# Patient Record
Sex: FEMALE | Race: WHITE | NOT HISPANIC OR LATINO | Employment: FULL TIME | URBAN - METROPOLITAN AREA
[De-identification: names, ages, dates, MRNs, and addresses within clinical notes are randomized per-mention and may not be internally consistent; named-entity substitution may affect disease eponyms.]

---

## 2023-09-25 LAB
ABO GROUP (TYPE) IN BLOOD: NORMAL
ANTIBODY SCREEN: NORMAL
ERYTHROCYTE DISTRIBUTION WIDTH (RATIO) BY AUTOMATED COUNT: 13.7 % (ref 11.5–14.5)
ERYTHROCYTE MEAN CORPUSCULAR HEMOGLOBIN CONCENTRATION (G/DL) BY AUTOMATED: 32.7 G/DL (ref 32–36)
ERYTHROCYTE MEAN CORPUSCULAR VOLUME (FL) BY AUTOMATED COUNT: 91 FL (ref 80–100)
ERYTHROCYTES (10*6/UL) IN BLOOD BY AUTOMATED COUNT: 4.38 X10E12/L (ref 4–5.2)
ESTIMATED AVERAGE GLUCOSE FOR HBA1C: 111 MG/DL
HEMATOCRIT (%) IN BLOOD BY AUTOMATED COUNT: 39.8 % (ref 36–46)
HEMOGLOBIN (G/DL) IN BLOOD: 13 G/DL (ref 12–16)
HEMOGLOBIN A1C/HEMOGLOBIN TOTAL IN BLOOD: 5.5 %
HEPATITIS B VIRUS SURFACE AG PRESENCE IN SERUM: NONREACTIVE
HIV 1/ 2 AG/AB SCREEN: NONREACTIVE
LEUKOCYTES (10*3/UL) IN BLOOD BY AUTOMATED COUNT: 6.1 X10E9/L (ref 4.4–11.3)
PLATELETS (10*3/UL) IN BLOOD AUTOMATED COUNT: 217 X10E9/L (ref 150–450)
REFLEX ADDED, ANEMIA PANEL: NORMAL
RH FACTOR: NORMAL
RUBELLA VIRUS IGG AB: NEGATIVE
SYPHILIS TOTAL AB: NONREACTIVE

## 2023-09-26 LAB — URINE CULTURE: NORMAL

## 2023-09-27 LAB
ABO GROUP (TYPE) IN BLOOD: NORMAL
ANTIBODY SCREEN: NORMAL
CHLAMYDIA TRACH., AMPLIFIED: NEGATIVE
N. GONORRHEA, AMPLIFIED: NEGATIVE
RH FACTOR: NORMAL

## 2023-10-06 ENCOUNTER — ANCILLARY PROCEDURE (OUTPATIENT)
Dept: RADIOLOGY | Facility: CLINIC | Age: 33
End: 2023-10-06
Payer: COMMERCIAL

## 2023-10-06 DIAGNOSIS — Z32.01 PREGNANCY TEST POSITIVE (HHS-HCC): ICD-10-CM

## 2023-10-06 PROCEDURE — 76817 TRANSVAGINAL US OBSTETRIC: CPT | Performed by: OBSTETRICS & GYNECOLOGY

## 2023-10-06 PROCEDURE — 76801 OB US < 14 WKS SINGLE FETUS: CPT

## 2023-10-06 PROCEDURE — 76817 TRANSVAGINAL US OBSTETRIC: CPT

## 2023-10-06 PROCEDURE — 76801 OB US < 14 WKS SINGLE FETUS: CPT | Performed by: OBSTETRICS & GYNECOLOGY

## 2023-10-13 PROBLEM — K21.9 GERD (GASTROESOPHAGEAL REFLUX DISEASE): Status: ACTIVE | Noted: 2023-10-13

## 2023-10-13 PROBLEM — F41.1 GENERALIZED ANXIETY DISORDER: Status: ACTIVE | Noted: 2023-10-13

## 2023-10-13 PROBLEM — Z34.90 PREGNANCY (HHS-HCC): Status: ACTIVE | Noted: 2023-10-13

## 2023-10-13 RX ORDER — PNV NO.95/FERROUS FUM/FOLIC AC 28MG-0.8MG
TABLET ORAL
COMMUNITY
End: 2024-04-18 | Stop reason: HOSPADM

## 2023-10-23 ENCOUNTER — ROUTINE PRENATAL (OUTPATIENT)
Dept: OBSTETRICS AND GYNECOLOGY | Facility: CLINIC | Age: 33
End: 2023-10-23
Payer: COMMERCIAL

## 2023-10-23 VITALS — SYSTOLIC BLOOD PRESSURE: 118 MMHG | BODY MASS INDEX: 31.25 KG/M2 | DIASTOLIC BLOOD PRESSURE: 70 MMHG | WEIGHT: 201 LBS

## 2023-10-23 DIAGNOSIS — Z3A.10 10 WEEKS GESTATION OF PREGNANCY (HHS-HCC): Primary | ICD-10-CM

## 2023-10-23 DIAGNOSIS — Z34.81 MULTIGRAVIDA IN FIRST TRIMESTER (HHS-HCC): ICD-10-CM

## 2023-10-23 PROCEDURE — 99213 OFFICE O/P EST LOW 20 MIN: CPT | Performed by: OBSTETRICS & GYNECOLOGY

## 2023-10-23 RX ORDER — CALCIUM CARBONATE 200(500)MG
TABLET,CHEWABLE ORAL
COMMUNITY
End: 2024-04-18 | Stop reason: HOSPADM

## 2023-10-23 NOTE — PROGRESS NOTES
Subjective   Patient ID [id number]   Amaya Todd is a 33 y.o.  at Unknown with a working estimated date of delivery of Not found. who presents for a routine prenatal visit. She denies vaginal bleeding, leakage of fluid, decreased fetal movements, and contractions.    Patient is doing well with no concerns.  States her nausea is improving.  No concerns.    Objective   Physical Exam   , Pregravid BMI: 34.83  Expected Total Weight Gain: 5 kg (11 lb)-9 kg (19 lb)   BP: 118/70  Fetal Heart Rate: 160      Prenatal Labs  Urine dip:  Lab Results   Component Value Date    KETONESU NEGATIVE 2023     Lab Results   Component Value Date    HGB 13.0 2023    HCT 39.8 2023    ABO A 2023    ABO CANCELED 2023    HEPBSAG NONREACTIVE 2023         Assessment/Plan   Problem List Items Addressed This Visit             ICD-10-CM    Multigravida in first trimester Z34.81    10 weeks gestation of pregnancy - Primary Z3A.10     -- History of  x3: We will repeat, patient is anxious about fourth   --History of MACROSOMIA 10 pounds 5 ounces: HgbA1c 5.5  --History of anxiety disorder, kidney stones  --Family history of Down syndrome and muscular dystrophy: Patient declines cfDNA, cystic fibrosis, and SMA  --ULTRASOUND: Dating ultrasound on 10/6 at 7.1 weeks, scheduled for 13-week ultrasound on             Follow-up in 4 weeks

## 2023-10-23 NOTE — ASSESSMENT & PLAN NOTE
-- History of  x3: We will repeat, patient is anxious about fourth   --History of MACROSOMIA 10 pounds 5 ounces: HgbA1c 5.5  --History of anxiety disorder, kidney stones  --Family history of Down syndrome and muscular dystrophy: Patient declines cfDNA, cystic fibrosis, and SMA  --ULTRASOUND: Dating ultrasound on 10/6 at 7.1 weeks, scheduled for 13-week ultrasound on

## 2023-11-13 ENCOUNTER — ANCILLARY PROCEDURE (OUTPATIENT)
Dept: RADIOLOGY | Facility: CLINIC | Age: 33
End: 2023-11-13
Payer: COMMERCIAL

## 2023-11-13 DIAGNOSIS — Z36.82 ENCOUNTER FOR ANTENATAL SCREENING FOR NUCHAL TRANSLUCENCY (HHS-HCC): ICD-10-CM

## 2023-11-13 PROCEDURE — 76813 OB US NUCHAL MEAS 1 GEST: CPT | Performed by: OBSTETRICS & GYNECOLOGY

## 2023-11-13 PROCEDURE — 76813 OB US NUCHAL MEAS 1 GEST: CPT

## 2023-11-16 ENCOUNTER — TELEPHONE (OUTPATIENT)
Dept: OBSTETRICS AND GYNECOLOGY | Facility: CLINIC | Age: 33
End: 2023-11-16
Payer: COMMERCIAL

## 2023-11-16 DIAGNOSIS — Z34.81 MULTIGRAVIDA IN FIRST TRIMESTER (HHS-HCC): Primary | ICD-10-CM

## 2023-11-16 RX ORDER — ONDANSETRON 4 MG/1
4 TABLET, ORALLY DISINTEGRATING ORAL EVERY 6 HOURS PRN
Qty: 20 TABLET | Refills: 0 | Status: SHIPPED | OUTPATIENT
Start: 2023-11-16 | End: 2023-11-23

## 2023-11-16 RX ORDER — SERTRALINE HYDROCHLORIDE 50 MG/1
50 TABLET, FILM COATED ORAL DAILY
Qty: 30 TABLET | Refills: 11 | Status: SHIPPED | OUTPATIENT
Start: 2023-11-16 | End: 2023-12-08 | Stop reason: WASHOUT

## 2023-11-20 ENCOUNTER — ROUTINE PRENATAL (OUTPATIENT)
Dept: OBSTETRICS AND GYNECOLOGY | Facility: CLINIC | Age: 33
End: 2023-11-20
Payer: COMMERCIAL

## 2023-11-20 VITALS — DIASTOLIC BLOOD PRESSURE: 82 MMHG | BODY MASS INDEX: 31.56 KG/M2 | WEIGHT: 203 LBS | SYSTOLIC BLOOD PRESSURE: 130 MMHG

## 2023-11-20 DIAGNOSIS — Z3A.14 14 WEEKS GESTATION OF PREGNANCY (HHS-HCC): Primary | ICD-10-CM

## 2023-11-20 DIAGNOSIS — Z34.82 MULTIGRAVIDA IN SECOND TRIMESTER (HHS-HCC): ICD-10-CM

## 2023-11-20 PROCEDURE — 99213 OFFICE O/P EST LOW 20 MIN: CPT | Performed by: OBSTETRICS & GYNECOLOGY

## 2023-11-20 NOTE — ASSESSMENT & PLAN NOTE
IUP at 14.1 weeks.  Nausea is controlled with Zofran as needed.  Patient had increased anxiety last week, started Zoloft however discontinued after 1 dose.  Feels she is doing better this week, desires to observe.  Patient with anatomy scan scheduled for January 4    PLAN:  Follow-up in 4 weeks  Anatomy scan January 4

## 2023-11-20 NOTE — PROGRESS NOTES
Subjective     Amaya Todd is a 33 y.o.  at 14w1d with a working estimated date of delivery of 2024, by Last Menstrual Period who presents for a routine prenatal visit.     Patient is doing well, nausea controlled with Zofran.  Patient has had moderate anxiety, tried Zoloft however stopped after 1 dose, desires to observe at this time.    Objective   Physical Exam  Weight: 92.1 kg (203 lb)  Expected Total Weight Gain: 5 kg (11 lb)-9 kg (19 lb)   Pregravid BMI: 34.83  BP: 130/82  Fetal Heart Rate: 160 Fundal Height (cm): 14 cm    Prenatal Labs  Urine dip:  Lab Results   Component Value Date    KETONESU NEGATIVE 2023       Lab Results   Component Value Date    HGB 13.0 2023    HCT 39.8 2023    ABO A 2023    ABO CANCELED 2023    HEPBSAG NONREACTIVE 2023             Problem List Items Addressed This Visit       Multigravida in second trimester    14 weeks gestation of pregnancy - Primary    Overview     -- History of  x3: We will repeat, patient is anxious about fourth   --History of MACROSOMIA 10 pounds 5 ounces: HgbA1c 5.5  --History of anxiety patient desires to observe   -- History of  kidney stones  --Family history of Down syndrome and muscular dystrophy: Patient declines cfDNA, cystic fibrosis, and SMA  --ULTRASOUND: Dating ultrasound on 10/6 at 7.1 weeks, normal NT on , anatomy scan scheduled          Current Assessment & Plan     IUP at 14.1 weeks.  Nausea is controlled with Zofran as needed.  Patient had increased anxiety last week, started Zoloft however discontinued after 1 dose.  Feels she is doing better this week, desires to observe.  Patient with anatomy scan scheduled for     PLAN:  Follow-up in 4 weeks  Anatomy scan

## 2023-11-30 ENCOUNTER — HOSPITAL ENCOUNTER (EMERGENCY)
Facility: HOSPITAL | Age: 33
Discharge: HOME | End: 2023-11-30
Attending: EMERGENCY MEDICINE
Payer: COMMERCIAL

## 2023-11-30 VITALS
BODY MASS INDEX: 31.37 KG/M2 | DIASTOLIC BLOOD PRESSURE: 80 MMHG | HEART RATE: 88 BPM | SYSTOLIC BLOOD PRESSURE: 128 MMHG | OXYGEN SATURATION: 98 % | TEMPERATURE: 98.2 F | HEIGHT: 68 IN | RESPIRATION RATE: 16 BRPM | WEIGHT: 207 LBS

## 2023-11-30 DIAGNOSIS — O23.42 URINARY TRACT INFECTION IN MOTHER DURING SECOND TRIMESTER OF PREGNANCY (HHS-HCC): Primary | ICD-10-CM

## 2023-11-30 LAB
APPEARANCE UR: CLEAR
BACTERIA #/AREA URNS AUTO: ABNORMAL /HPF
BILIRUB UR STRIP.AUTO-MCNC: NEGATIVE MG/DL
COLOR UR: ABNORMAL
GLUCOSE UR STRIP.AUTO-MCNC: NEGATIVE MG/DL
KETONES UR STRIP.AUTO-MCNC: NEGATIVE MG/DL
LEUKOCYTE ESTERASE UR QL STRIP.AUTO: NEGATIVE
MUCOUS THREADS #/AREA URNS AUTO: ABNORMAL /LPF
NITRITE UR QL STRIP.AUTO: NEGATIVE
PH UR STRIP.AUTO: 6 [PH]
PROT UR STRIP.AUTO-MCNC: NEGATIVE MG/DL
RBC # UR STRIP.AUTO: ABNORMAL /UL
RBC #/AREA URNS AUTO: >20 /HPF
SP GR UR STRIP.AUTO: 1.01
SQUAMOUS #/AREA URNS AUTO: ABNORMAL /HPF
UROBILINOGEN UR STRIP.AUTO-MCNC: <2 MG/DL
WBC #/AREA URNS AUTO: ABNORMAL /HPF

## 2023-11-30 PROCEDURE — 2500000001 HC RX 250 WO HCPCS SELF ADMINISTERED DRUGS (ALT 637 FOR MEDICARE OP): Performed by: EMERGENCY MEDICINE

## 2023-11-30 PROCEDURE — 99283 EMERGENCY DEPT VISIT LOW MDM: CPT | Performed by: EMERGENCY MEDICINE

## 2023-11-30 PROCEDURE — 81003 URINALYSIS AUTO W/O SCOPE: CPT | Performed by: EMERGENCY MEDICINE

## 2023-11-30 RX ORDER — CEPHALEXIN 250 MG/1
500 CAPSULE ORAL ONCE
Status: COMPLETED | OUTPATIENT
Start: 2023-11-30 | End: 2023-11-30

## 2023-11-30 RX ORDER — CEPHALEXIN 500 MG/1
500 CAPSULE ORAL 2 TIMES DAILY
Qty: 20 CAPSULE | Refills: 0 | Status: SHIPPED | OUTPATIENT
Start: 2023-11-30 | End: 2023-12-10

## 2023-11-30 RX ADMIN — CEPHALEXIN 500 MG: 250 CAPSULE ORAL at 05:41

## 2023-11-30 ASSESSMENT — COLUMBIA-SUICIDE SEVERITY RATING SCALE - C-SSRS
1. IN THE PAST MONTH, HAVE YOU WISHED YOU WERE DEAD OR WISHED YOU COULD GO TO SLEEP AND NOT WAKE UP?: NO
2. HAVE YOU ACTUALLY HAD ANY THOUGHTS OF KILLING YOURSELF?: NO
6. HAVE YOU EVER DONE ANYTHING, STARTED TO DO ANYTHING, OR PREPARED TO DO ANYTHING TO END YOUR LIFE?: NO

## 2023-11-30 ASSESSMENT — PAIN - FUNCTIONAL ASSESSMENT: PAIN_FUNCTIONAL_ASSESSMENT: 0-10

## 2023-11-30 ASSESSMENT — PAIN SCALES - GENERAL: PAINLEVEL_OUTOF10: 5 - MODERATE PAIN

## 2023-11-30 NOTE — ED PROVIDER NOTES
HPI   Chief Complaint   Patient presents with    Urinary Problem     Patient presents to the ED with c/o thinking she has an UTI.  She is 15 wks pregnant.  Patient denies abdominal pain.       HPI     HISTORY OF PRESENT ILLNESS:  Patient is a 33-year-old female G5, P3 with history of 1 miscarriage, currently 15 weeks gestation presenting to the emergency department dysuria.  Patient woke up with pain.  She has had prior kidney stones and does not feel any side pain, nausea.  No rash.  No vaginal discharge.  This does not feel like her kidney stone.  Dr. Deutsch is her OB gynecologist.    Past Medical History: Prior history of kidney stones, she has bad anxiety  Past Surgical History: 3 C-sections      __________________________________________________________  PHYSICAL EXAM:    Appearance: Alert, oriented , cooperative   Skin: Intact,  dry skin, no lesions, rash, petechiae or purpura.   Eyes: PERRLA, EOMs intact,  Conjunctiva pink with no redness or exudates.    HENT: Normocephalic, atraumatic. Nares patent   Neck: Supple. Trachea at midline.   Pulmonary: Lung sounds are clear bilaterally.  There is no rales, rhonchi, or wheezing.  Cardiac: Regular rate and rhythm, no rubs, murmurs, or gallops. No JVD,   Abdomen: Abdomen is soft, nontender, and nondistended.  No palpable organomegaly.  No rebound or guarding.  No CVA tenderness. Nonsurgical abdomen  Genitourinary: Exam deferred.  Musculoskeletal: no edema, pain, cyanosis, or deformity in extremities. Pulses full and equal.   Neurological:  Cranial nerves are grossly intact, grossly normal sensation, no weakness, no focal findings identified.    __________________________________________________________  MEDICAL DECISION MAKING:    Patient was seen and examined.  Patient is having discomfort at her urethra which woke her up.  She does have a prior history of kidney stones but this does not feel like a kidney stone.  When I evaluated her, I could not elicit any  CVA tenderness or abdominal tenderness.  She is not complaining of vaginal discharge.  No rash.  She had already underwent STD testing by her OB gynecologist and I do not think a speculum exam was needed at this time.  Patient urinalysis did have 1+ bacteria in addition to red blood cells.  Again, I do not believe that there was a kidney stone with the lack of flank or belly pain.  Patient was provided antibiotics for presumed urinary tract infection.  Return precautions discussed.  Patient follow-up with her OB gynecologist.  Patient verbalized understanding, agreed to plan, patient was discharged home.  No, patient vital sign was initially tachycardic but she states she is extremely anxious and did state come down without any intervention such as IV fluids.    Tera Esteves  Emergency Medicine               Han Coma Scale Score: 15                  Patient History   Past Medical History:   Diagnosis Date    11 weeks gestation of pregnancy 2019    11 weeks gestation of pregnancy    8 weeks gestation of pregnancy 2019    8 weeks gestation of pregnancy    Abnormal glucose complicating pregnancy 2021    Pregnancy with abnormal glucose tolerance test    Encounter for  screening for malformations 10/07/2021    Encounter for  screening for malformation    Encounter for supervision of normal pregnancy, unspecified, first trimester 2021    Encounter for pregnancy related examination, first trimester    Encounter for supervision of normal pregnancy, unspecified, second trimester 2021    Encounter for pregnancy related examination in second trimester    Encounter for supervision of normal pregnancy, unspecified, third trimester 2022    Encounter for pregnancy related examination in third trimester    Less than 8 weeks gestation of pregnancy 10/14/2019    Less than 8 weeks gestation of pregnancy    Other conditions influencing health status 10/14/2019    History of  pregnancy    Pregnancy with inconclusive fetal viability, not applicable or unspecified 2021    Encounter to determine fetal viability of pregnancy     Past Surgical History:   Procedure Laterality Date    OTHER SURGICAL HISTORY  10/28/2022     section    OTHER SURGICAL HISTORY  2020    [place] tooth extraction    OTHER SURGICAL HISTORY  10/31/2019    Dilation and curettage     No family history on file.  Social History     Tobacco Use    Smoking status: Never    Smokeless tobacco: Never   Vaping Use    Vaping Use: Never used   Substance Use Topics    Alcohol use: Not Currently    Drug use: Never       Physical Exam   ED Triage Vitals [23 0054]   Temp Heart Rate Resp BP   36.5 °C (97.7 °F) (!) 119 16 146/80      SpO2 Temp src Heart Rate Source Patient Position   98 % -- -- --      BP Location FiO2 (%)     -- --       Physical Exam    ED Course & MDM   Diagnoses as of 23 0530   Urinary tract infection in mother during second trimester of pregnancy       Medical Decision Making      Procedure  Procedures     Tera Esteves DO  [id number]

## 2023-12-04 ENCOUNTER — TELEPHONE (OUTPATIENT)
Dept: OBSTETRICS AND GYNECOLOGY | Facility: CLINIC | Age: 33
End: 2023-12-04
Payer: COMMERCIAL

## 2023-12-04 NOTE — TELEPHONE ENCOUNTER
Patient called stating she went to the ER on 11/29/23. She was diagnosed with a UTI and was told to follow up with Dr. Deutsch. She said it is better than it was but feels like it is still lingering. She does have 5 more days of Keflex to take.

## 2023-12-05 ENCOUNTER — APPOINTMENT (OUTPATIENT)
Dept: RADIOLOGY | Facility: CLINIC | Age: 33
End: 2023-12-05
Payer: COMMERCIAL

## 2023-12-06 PROBLEM — Z3A.16 16 WEEKS GESTATION OF PREGNANCY (HHS-HCC): Status: ACTIVE | Noted: 2023-10-23

## 2023-12-08 ENCOUNTER — ROUTINE PRENATAL (OUTPATIENT)
Dept: OBSTETRICS AND GYNECOLOGY | Facility: CLINIC | Age: 33
End: 2023-12-08
Payer: COMMERCIAL

## 2023-12-08 VITALS — SYSTOLIC BLOOD PRESSURE: 118 MMHG | BODY MASS INDEX: 31.78 KG/M2 | WEIGHT: 209 LBS | DIASTOLIC BLOOD PRESSURE: 72 MMHG

## 2023-12-08 DIAGNOSIS — Z3A.16 16 WEEKS GESTATION OF PREGNANCY (HHS-HCC): ICD-10-CM

## 2023-12-08 DIAGNOSIS — Z34.82 MULTIGRAVIDA IN SECOND TRIMESTER (HHS-HCC): Primary | ICD-10-CM

## 2023-12-08 PROCEDURE — 99213 OFFICE O/P EST LOW 20 MIN: CPT | Performed by: OBSTETRICS & GYNECOLOGY

## 2023-12-08 RX ORDER — POLYETHYLENE GLYCOL 3350 17 G/17G
17 POWDER, FOR SOLUTION ORAL DAILY
COMMUNITY
End: 2024-04-18 | Stop reason: HOSPADM

## 2023-12-08 NOTE — PROGRESS NOTES
Subjective     Amaya Todd is a 33 y.o.  at 16w5d with a working estimated date of delivery of 2024, by Last Menstrual Period who presents for a routine prenatal visit.     Patient with fetal flutters.  Was seen in the ED, currently finishing course of Keflex.  Prior symptoms have resolved.    Objective   Physical Exam  Weight: 94.8 kg (209 lb)  Expected Total Weight Gain: 5 kg (11 lb)-9 kg (19 lb)   Pregravid BMI: 34.07  BP: 118/72         Prenatal Labs  Urine dip:  Lab Results   Component Value Date    KETONESU NEGATIVE 2023       Lab Results   Component Value Date    HGB 13.0 2023    HCT 39.8 2023    ABO A 2023    ABO CANCELED 2023    HEPBSAG NONREACTIVE 2023             Problem List Items Addressed This Visit       Multigravida in second trimester - Primary    16 weeks gestation of pregnancy    Overview     -- History of  x3: We will repeat, patient is anxious about fourth   --History of MACROSOMIA 10 pounds 5 ounces: HgbA1c 5.5  --History of anxiety patient desires to observe   -- History of  kidney stones  --Family history of Down syndrome and muscular dystrophy: Patient declines cfDNA, cystic fibrosis, and SMA  --ULTRASOUND: Dating ultrasound on 10/6 at 7.1 weeks, normal NT on , anatomy scan scheduled          Current Assessment & Plan     IUP at 16.5 weeks patient feeling occasional flutters  Patient seen in ED last week, diagnosed with UTI and placed on Keflex.  Patient was having cramping, states she is feeling better.  Denies fever or chills.  Urine culture not sent  No other concerns.    PLAN:  Finish Keflex  Follow-up with routine OB care

## 2023-12-18 PROBLEM — Z3A.18 18 WEEKS GESTATION OF PREGNANCY (HHS-HCC): Status: ACTIVE | Noted: 2023-10-23

## 2023-12-19 ENCOUNTER — ROUTINE PRENATAL (OUTPATIENT)
Dept: OBSTETRICS AND GYNECOLOGY | Facility: CLINIC | Age: 33
End: 2023-12-19
Payer: COMMERCIAL

## 2023-12-19 VITALS — WEIGHT: 211 LBS | SYSTOLIC BLOOD PRESSURE: 132 MMHG | BODY MASS INDEX: 32.08 KG/M2 | DIASTOLIC BLOOD PRESSURE: 64 MMHG

## 2023-12-19 DIAGNOSIS — Z3A.18 18 WEEKS GESTATION OF PREGNANCY (HHS-HCC): ICD-10-CM

## 2023-12-19 DIAGNOSIS — Z34.82 MULTIGRAVIDA IN SECOND TRIMESTER (HHS-HCC): Primary | ICD-10-CM

## 2023-12-19 PROCEDURE — 99213 OFFICE O/P EST LOW 20 MIN: CPT | Performed by: OBSTETRICS & GYNECOLOGY

## 2023-12-19 NOTE — ASSESSMENT & PLAN NOTE
IUP at 18.2 weeks  Patient doing well with no concerns.  Repeat ultrasound scheduled January 4    PLAN:  Follow-up in 4 weeks

## 2023-12-19 NOTE — PROGRESS NOTES
Subjective     Amaya Todd is a 33 y.o.  at 18w2d with a working estimated date of delivery of 2024, by Last Menstrual Period who presents for a routine prenatal visit.       Objective   Physical Exam  Weight: 95.7 kg (211 lb)  Expected Total Weight Gain: 5 kg (11 lb)-9 kg (19 lb)   Pregravid BMI: 34.07  BP: 132/64  Fetal Heart Rate: 130 Fundal Height (cm): 18 cm    Prenatal Labs  Urine dip:  Lab Results   Component Value Date    KETONESU NEGATIVE 2023       Lab Results   Component Value Date    HGB 13.0 2023    HCT 39.8 2023    ABO A 2023    ABO CANCELED 2023    HEPBSAG NONREACTIVE 2023             Problem List Items Addressed This Visit       Multigravida in second trimester - Primary    18 weeks gestation of pregnancy    Overview     -- History of  x3: We will repeat, patient is anxious about fourth   --History of MACROSOMIA 10 pounds 5 ounces: HgbA1c 5.5  --History of anxiety patient desires to observe   -- History of  kidney stones  --Family history of Down syndrome and muscular dystrophy: Patient declines cfDNA, cystic fibrosis, and SMA  --ULTRASOUND: Dating ultrasound on 10/6 at 7.1 weeks, normal NT on , anatomy scan scheduled          Current Assessment & Plan     IUP at 18.2 weeks  Patient doing well with no concerns.  Repeat ultrasound scheduled     PLAN:  Follow-up in 4 weeks

## 2024-01-04 ENCOUNTER — ANCILLARY PROCEDURE (OUTPATIENT)
Dept: RADIOLOGY | Facility: CLINIC | Age: 34
End: 2024-01-04
Payer: COMMERCIAL

## 2024-01-04 DIAGNOSIS — O09.899: ICD-10-CM

## 2024-01-04 DIAGNOSIS — Z3A.10 10 WEEKS GESTATION OF PREGNANCY (HHS-HCC): ICD-10-CM

## 2024-01-04 PROCEDURE — 76817 TRANSVAGINAL US OBSTETRIC: CPT

## 2024-01-04 PROCEDURE — 76817 TRANSVAGINAL US OBSTETRIC: CPT | Performed by: STUDENT IN AN ORGANIZED HEALTH CARE EDUCATION/TRAINING PROGRAM

## 2024-01-04 PROCEDURE — 76811 OB US DETAILED SNGL FETUS: CPT

## 2024-01-04 PROCEDURE — 76811 OB US DETAILED SNGL FETUS: CPT | Performed by: STUDENT IN AN ORGANIZED HEALTH CARE EDUCATION/TRAINING PROGRAM

## 2024-01-12 ENCOUNTER — ANCILLARY PROCEDURE (OUTPATIENT)
Dept: RADIOLOGY | Facility: CLINIC | Age: 34
End: 2024-01-12
Payer: COMMERCIAL

## 2024-01-12 DIAGNOSIS — Z32.01 PREGNANCY TEST POSITIVE (HHS-HCC): ICD-10-CM

## 2024-01-12 PROCEDURE — 76815 OB US LIMITED FETUS(S): CPT

## 2024-01-12 PROCEDURE — 76817 TRANSVAGINAL US OBSTETRIC: CPT | Performed by: STUDENT IN AN ORGANIZED HEALTH CARE EDUCATION/TRAINING PROGRAM

## 2024-01-12 PROCEDURE — 76817 TRANSVAGINAL US OBSTETRIC: CPT

## 2024-01-12 PROCEDURE — 76815 OB US LIMITED FETUS(S): CPT | Performed by: STUDENT IN AN ORGANIZED HEALTH CARE EDUCATION/TRAINING PROGRAM

## 2024-01-13 PROBLEM — Z3A.22 22 WEEKS GESTATION OF PREGNANCY (HHS-HCC): Status: ACTIVE | Noted: 2023-10-23

## 2024-01-15 ENCOUNTER — ROUTINE PRENATAL (OUTPATIENT)
Dept: OBSTETRICS AND GYNECOLOGY | Facility: CLINIC | Age: 34
End: 2024-01-15
Payer: COMMERCIAL

## 2024-01-15 VITALS — SYSTOLIC BLOOD PRESSURE: 124 MMHG | WEIGHT: 210 LBS | BODY MASS INDEX: 31.93 KG/M2 | DIASTOLIC BLOOD PRESSURE: 70 MMHG

## 2024-01-15 DIAGNOSIS — Z3A.22 22 WEEKS GESTATION OF PREGNANCY (HHS-HCC): ICD-10-CM

## 2024-01-15 PROCEDURE — 99213 OFFICE O/P EST LOW 20 MIN: CPT | Performed by: OBSTETRICS & GYNECOLOGY

## 2024-01-15 NOTE — ASSESSMENT & PLAN NOTE
IUP at 22.1 weeks  Good fetal movement, patient denies any bleeding.  Reviewed ultrasound of vasa previa, recommend no vaginal exams and pelvic rest  Patient will obtain MFM consult and repeat ultrasound in 4 weeks  Discussed delivery at 34 to 37 weeks  Patient will notify me for any bleeding.    PLAN:  Follow-up in 4 weeks

## 2024-01-15 NOTE — PROGRESS NOTES
Subjective     Amaya Todd is a 33 y.o.  at 22w1d with a working estimated date of delivery of 2024, by Last Menstrual Period who presents for a routine prenatal visit.     Patient doing well with good fetal movement.  Patient does have anxiety from the vasa previa and previous  sections.    Objective   Physical Exam  Weight: 95.3 kg (210 lb)  Expected Total Weight Gain: 5 kg (11 lb)-9 kg (19 lb)   Pregravid BMI: 34.07  BP: 124/70  Fetal Heart Rate: 150 Fundal Height (cm): 22 cm    Prenatal Labs  Urine dip:  Lab Results   Component Value Date    KETONESU NEGATIVE 2023       Lab Results   Component Value Date    HGB 13.0 2023    HCT 39.8 2023    ABO A 2023    ABO CANCELED 2023    HEPBSAG NONREACTIVE 2023             Problem List Items Addressed This Visit       22 weeks gestation of pregnancy    Overview     -- History of  x3: We will repeat, patient is anxious about fourth   --History of MACROSOMIA 10 pounds 5 ounces: HgbA1c 5.5  --History of anxiety patient desires to observe   -- History of  kidney stones  --Family history of Down syndrome and muscular dystrophy: Patient declines cfDNA, cystic fibrosis, and SMA  --ULTRASOUND: Dating ultrasound on 10/6 at 7.1 weeks, normal NT on ; US  at 19 wks; US  with SUA, and vasa previa  -- VASA PREVIA: Noted on 20-week ultrasound on .  Plan delivery at 34 to 37 weeks a formal consult with MFM to be scheduled and repeat ultrasound in 4 weeks         Current Assessment & Plan     IUP at 22.1 weeks  Good fetal movement, patient denies any bleeding.  Reviewed ultrasound of vasa previa, recommend no vaginal exams and pelvic rest  Patient will obtain MFM consult and repeat ultrasound in 4 weeks  Discussed delivery at 34 to 37 weeks  Patient will notify me for any bleeding.    PLAN:  Follow-up in 4 weeks         Vasa previa - Primary

## 2024-02-06 PROBLEM — Z3A.26 26 WEEKS GESTATION OF PREGNANCY (HHS-HCC): Status: ACTIVE | Noted: 2023-10-23

## 2024-02-12 ENCOUNTER — APPOINTMENT (OUTPATIENT)
Dept: OBSTETRICS AND GYNECOLOGY | Facility: CLINIC | Age: 34
End: 2024-02-12
Payer: COMMERCIAL

## 2024-02-12 ENCOUNTER — INITIAL PRENATAL (OUTPATIENT)
Dept: MATERNAL FETAL MEDICINE | Facility: CLINIC | Age: 34
End: 2024-02-12
Payer: COMMERCIAL

## 2024-02-12 ENCOUNTER — HOSPITAL ENCOUNTER (OUTPATIENT)
Dept: RADIOLOGY | Facility: CLINIC | Age: 34
Discharge: HOME | End: 2024-02-12
Payer: COMMERCIAL

## 2024-02-12 VITALS — DIASTOLIC BLOOD PRESSURE: 82 MMHG | WEIGHT: 218 LBS | BODY MASS INDEX: 33.15 KG/M2 | SYSTOLIC BLOOD PRESSURE: 139 MMHG

## 2024-02-12 DIAGNOSIS — Z3A.26 26 WEEKS GESTATION OF PREGNANCY (HHS-HCC): Primary | ICD-10-CM

## 2024-02-12 DIAGNOSIS — F41.9 ANXIETY: ICD-10-CM

## 2024-02-12 PROCEDURE — 99215 OFFICE O/P EST HI 40 MIN: CPT | Performed by: OBSTETRICS & GYNECOLOGY

## 2024-02-12 PROCEDURE — 99205 OFFICE O/P NEW HI 60 MIN: CPT | Performed by: OBSTETRICS & GYNECOLOGY

## 2024-02-12 NOTE — LETTER
2024     Meir Deutsch MD  [address] [address]    Patient: Amaya Todd   YOB: 1990   Date of Visit: 2024       Dear Dr. Meir Deutsch MD:    Thank you for referring Amaya Todd to me for evaluation. Below are my notes for this consultation.  If you have questions, please do not hesitate to call me. I look forward to following your patient along with you.       Sincerely,     Brien Melendrez MD      CC: No Recipients  ______________________________________________________________________________________    2024   Amaya Todd     [facility] CONSULT NOTE  Referring Clinician: Meir Mir MD  Reason for consult: Vasa Previa    HPI: Amaya Todd is a 33 y.o.  at 26w1d here for consult for vasa previa    She is currently feeling well today without acute complaints.  Having some anxiety about the vasa previa.    10 point review of system is negative except as above    OB History  OB History    Para Term  AB Living   5 3 3 0 1 3   SAB IAB Ectopic Multiple Live Births   1 0 0 0 3      # Outcome Date GA Lbr Eulogio/2nd Weight Sex Delivery Anes PTL Lv   5 Current            4 Term  39w0d  3.997 kg M CS-LTranv   NELY   3 Term  39w0d  4.054 kg M CS-LTranv   NELY      Complications: Failure to Progress in First Stage   2 SAB 2019 8w0d             Birth Comments: D&C   1 Term 2018 39w0d  4.678 kg M CS-LTranv   NELY       Medical History  History reviewed. No pertinent past medical history.    Surgical History  Past Surgical History:   Procedure Laterality Date   •  SECTION, LOW TRANSVERSE  10/28/2022     section   • DILATION AND CURETTAGE OF UTERUS  10/31/2019    Dilation and curettage   • WISDOM TOOTH EXTRACTION  2020    [place] tooth extraction       Family History  family history is not on file.    Social History  Social History     Tobacco Use   • Smoking status: Never   • Smokeless tobacco: Never    Vaping Use   • Vaping Use: Never used   Substance Use Topics   • Alcohol use: Not Currently   • Drug use: Never       Allergies  No Known Allergies    Medications:  Medication Documentation Review Audit       Reviewed by Amanda Akins RN (Registered Nurse) on 24 at 0856      Medication Order Taking? Sig Documenting Provider Last Dose Status   calcium carbonate (Tums) 200 mg calcium chewable tablet [id number]  Chew. Historical Provider, MD  Active   polyethylene glycol (Glycolax, Miralax) 17 gram packet [id number]  Take 17 g by mouth once daily. Historical Provider, MD  Active   prenatal vit no.124-iron-folic (Prenatal Vitamin) 27 mg iron- 800 mcg tablet [id number]  Take by mouth. Historical Provider, MD  Active                    OBJECTIVE  Visit Vitals  /82   Wt 98.9 kg (218 lb)   LMP 2023   BMI 33.15 kg/m²   OB Status Pregnant   Smoking Status Never   BSA 2.18 m²       Physical exam  Gen: NAD  HEENT: EOMI, CN2-12 intact  Pulm: non-labored    ASSESSMENT & PLAN    Amaya CHANEY Perez is a 33 y.o.  at 26w1d here for the following:    #Vasa Previa  I reviewed the implications of vasa previa today.  I discussed that vasa previa is a rare entity defined as unprotected fetal vessels traversing the fetal membranes near the internal cervical os.  I reviewed the fetal risks of membrane rupture resulting in fetal bleeding and possible loss.  I did note that, in rare circumstances vasa previa can resolve.    Regarding pregnancy management I reviewed that the optimal management is unclear.  I dicussed that the mainstay of management is late  delivery with the goal of pre-labor  delivery to avoid fetal risks related to membrane rupture.  While the optimal timing of delivery is unclear, expert consensus recommends delivery from 34-37 weeks gestation with my personal practice being to recommend delivery at 34-35 weeks gestation.  I reviewed the possibility of inpatient management prior to this,  usually between 30-32 weeks gestation, with the the understanding that even inpatient admission cannot entirely remove fetal risks.  I reviewed that there is some expert opinion suggesting serial cervical length measurement and/or non-stress tests in the third trimester to risk stratify patients, though these are poor predictors of spontaneous  birth.      Following our discussion will pan for weekly serial cervical lengths starting at 28-29 weeks gestation with weekly NSTs.  Will plan to defer inpatient admission until delivery at 34-35 weeks.    #Anxiety   We reviewed the implications of treatment for depression in pregnancy and that generally the benefits of treated depression outweigh potential fetal risks associated with medication use.  We discussed that untreated depression in pregnancy carries with it risks of poor compliance with prenatal care, poor nutrition, low birth weight,  fetal growth, and disruptions in mother-infant bonding.  We did review that antidepressant medication is considered safe in pregnancy with the majority of data pertaining to SSRIs. While there is some potential for  withdrawal this is generally mild.  Given its safety profile, Zoloft is often considered as a first line medication though SSRIs may be used (paroxetine should be avoided due to its association with cardiac defects).    She is currently feeling like she is doing ok at this time, though will continue to assess.    # History of kidney stones  - no active symptoms this pregnancy    #SUA  - serial growth US as above.    #PNC  - PNB reviewed and normal  - aneuploidy screening declined  - GDM screen/3rd tri labs ordered today  - s/p flu vacine  -TdaP next visit      Thank you for allowing us to participate in the care of your patient.   Given the presence of a vasa previa management by MFM is recommended and so will plan for transfer of prenatal care.    I spent 60 minutes in the professional and overall  care of this patient.    Brien Melendrez MD  Maternal Fetal Medicine

## 2024-02-14 NOTE — PROGRESS NOTES
2024   Amaya Todd     [facility] CONSULT NOTE  Referring Clinician: Meir Mir MD  Reason for consult: Vasa Previa    HPI: Amaya Todd is a 33 y.o.  at 26w1d here for consult for vasa previa    She is currently feeling well today without acute complaints.  Having some anxiety about the vasa previa.    10 point review of system is negative except as above    OB History  OB History    Para Term  AB Living   5 3 3 0 1 3   SAB IAB Ectopic Multiple Live Births   1 0 0 0 3      # Outcome Date GA Lbr Eulogio/2nd Weight Sex Delivery Anes PTL Lv   5 Current            4 Term  39w0d  3.997 kg M CS-LTranv   NELY   3 Term  39w0d  4.054 kg M CS-LTranv   NELY      Complications: Failure to Progress in First Stage   2 SAB  8w0d             Birth Comments: D&C   1 Term 2018 39w0d  4.678 kg M CS-LTranv   NELY       Medical History  History reviewed. No pertinent past medical history.    Surgical History  Past Surgical History:   Procedure Laterality Date     SECTION, LOW TRANSVERSE  10/28/2022     section    DILATION AND CURETTAGE OF UTERUS  10/31/2019    Dilation and curettage    WISDOM TOOTH EXTRACTION  2020    [place] tooth extraction       Family History  family history is not on file.    Social History  Social History     Tobacco Use    Smoking status: Never    Smokeless tobacco: Never   Vaping Use    Vaping Use: Never used   Substance Use Topics    Alcohol use: Not Currently    Drug use: Never       Allergies  No Known Allergies    Medications:  Medication Documentation Review Audit       Reviewed by Amanda Akins RN (Registered Nurse) on 24 at 0856      Medication Order Taking? Sig Documenting Provider Last Dose Status   calcium carbonate (Tums) 200 mg calcium chewable tablet [id number]  Chew. Historical Provider, MD  Active   polyethylene glycol (Glycolax, Miralax) 17 gram packet [id number]  Take 17 g by mouth once daily. Historical Provider, MD  Active    prenatal vit no.124-iron-folic (Prenatal Vitamin) 27 mg iron- 800 mcg tablet [id number]  Take by mouth. Historical Provider, MD  Active                    OBJECTIVE  Visit Vitals  /82   Wt 98.9 kg (218 lb)   LMP 2023   BMI 33.15 kg/m²   OB Status Pregnant   Smoking Status Never   BSA 2.18 m²       Physical exam  Gen: NAD  HEENT: EOMI, CN2-12 intact  Pulm: non-labored    ASSESSMENT & PLAN    Amaya Todd is a 33 y.o.  at 26w1d here for the following:    #Vasa Previa  I reviewed the implications of vasa previa today.  I discussed that vasa previa is a rare entity defined as unprotected fetal vessels traversing the fetal membranes near the internal cervical os.  I reviewed the fetal risks of membrane rupture resulting in fetal bleeding and possible loss.  I did note that, in rare circumstances vasa previa can resolve.    Regarding pregnancy management I reviewed that the optimal management is unclear.  I dicussed that the mainstay of management is late  delivery with the goal of pre-labor  delivery to avoid fetal risks related to membrane rupture.  While the optimal timing of delivery is unclear, expert consensus recommends delivery from 34-37 weeks gestation with my personal practice being to recommend delivery at 34-35 weeks gestation.  I reviewed the possibility of inpatient management prior to this, usually between 30-32 weeks gestation, with the the understanding that even inpatient admission cannot entirely remove fetal risks.  I reviewed that there is some expert opinion suggesting serial cervical length measurement and/or non-stress tests in the third trimester to risk stratify patients, though these are poor predictors of spontaneous  birth.      Following our discussion will pan for weekly serial cervical lengths starting at 28-29 weeks gestation with weekly NSTs.  Will plan to defer inpatient admission until delivery at 34-35 weeks.    #Anxiety   We reviewed the  implications of treatment for depression in pregnancy and that generally the benefits of treated depression outweigh potential fetal risks associated with medication use.  We discussed that untreated depression in pregnancy carries with it risks of poor compliance with prenatal care, poor nutrition, low birth weight,  fetal growth, and disruptions in mother-infant bonding.  We did review that antidepressant medication is considered safe in pregnancy with the majority of data pertaining to SSRIs. While there is some potential for  withdrawal this is generally mild.  Given its safety profile, Zoloft is often considered as a first line medication though SSRIs may be used (paroxetine should be avoided due to its association with cardiac defects).    She is currently feeling like she is doing ok at this time, though will continue to assess.    # History of kidney stones  - no active symptoms this pregnancy    #SUA  - serial growth US as above.    #PNC  - PNB reviewed and normal  - aneuploidy screening declined  - GDM screen/3rd tri labs ordered today  - s/p flu vacine  -TdaP next visit      Thank you for allowing us to participate in the care of your patient.   Given the presence of a vasa previa management by MFM is recommended and so will plan for transfer of prenatal care.    I spent 60 minutes in the professional and overall care of this patient.    Brien Melendrez MD  Maternal Fetal Medicine

## 2024-02-24 ENCOUNTER — LAB (OUTPATIENT)
Dept: LAB | Facility: LAB | Age: 34
End: 2024-02-24
Payer: COMMERCIAL

## 2024-02-24 DIAGNOSIS — Z3A.26 26 WEEKS GESTATION OF PREGNANCY (HHS-HCC): ICD-10-CM

## 2024-02-24 LAB
ERYTHROCYTE [DISTWIDTH] IN BLOOD BY AUTOMATED COUNT: 14.1 % (ref 11.5–14.5)
GLUCOSE 1H P 50 G GLC PO SERPL-MCNC: 115 MG/DL
HCT VFR BLD AUTO: 34.9 % (ref 36–46)
HGB BLD-MCNC: 11.5 G/DL (ref 12–16)
MCH RBC QN AUTO: 31.6 PG (ref 26–34)
MCHC RBC AUTO-ENTMCNC: 33 G/DL (ref 32–36)
MCV RBC AUTO: 96 FL (ref 80–100)
NRBC BLD-RTO: 0 /100 WBCS (ref 0–0)
PLATELET # BLD AUTO: 226 X10*3/UL (ref 150–450)
RBC # BLD AUTO: 3.64 X10*6/UL (ref 4–5.2)
REFLEX ADDED, ANEMIA PANEL: NORMAL
WBC # BLD AUTO: 8.4 X10*3/UL (ref 4.4–11.3)

## 2024-02-24 PROCEDURE — 85027 COMPLETE CBC AUTOMATED: CPT

## 2024-02-24 PROCEDURE — 82947 ASSAY GLUCOSE BLOOD QUANT: CPT

## 2024-02-24 PROCEDURE — 86780 TREPONEMA PALLIDUM: CPT

## 2024-02-24 PROCEDURE — 36415 COLL VENOUS BLD VENIPUNCTURE: CPT

## 2024-02-25 LAB — TREPONEMA PALLIDUM IGG+IGM AB [PRESENCE] IN SERUM OR PLASMA BY IMMUNOASSAY: NONREACTIVE

## 2024-02-26 ENCOUNTER — APPOINTMENT (OUTPATIENT)
Dept: OBSTETRICS AND GYNECOLOGY | Facility: CLINIC | Age: 34
End: 2024-02-26
Payer: COMMERCIAL

## 2024-03-01 ENCOUNTER — HOSPITAL ENCOUNTER (OUTPATIENT)
Facility: HOSPITAL | Age: 34
Setting detail: OBSERVATION
LOS: 1 days | Discharge: HOME | End: 2024-03-02
Attending: STUDENT IN AN ORGANIZED HEALTH CARE EDUCATION/TRAINING PROGRAM
Payer: COMMERCIAL

## 2024-03-01 ENCOUNTER — APPOINTMENT (OUTPATIENT)
Dept: OBSTETRICS AND GYNECOLOGY | Facility: CLINIC | Age: 34
End: 2024-03-01
Payer: COMMERCIAL

## 2024-03-01 ENCOUNTER — ROUTINE PRENATAL (OUTPATIENT)
Dept: MATERNAL FETAL MEDICINE | Facility: CLINIC | Age: 34
End: 2024-03-01
Payer: COMMERCIAL

## 2024-03-01 VITALS — DIASTOLIC BLOOD PRESSURE: 76 MMHG | WEIGHT: 228 LBS | BODY MASS INDEX: 34.67 KG/M2 | SYSTOLIC BLOOD PRESSURE: 137 MMHG

## 2024-03-01 DIAGNOSIS — Z34.90 ENCOUNTER FOR SUPERVISION OF NORMAL PREGNANCY, ANTEPARTUM, UNSPECIFIED GRAVIDITY (HHS-HCC): ICD-10-CM

## 2024-03-01 PROBLEM — O47.03 PRETERM UTERINE CONTRACTIONS, ANTEPARTUM, THIRD TRIMESTER (HHS-HCC): Status: ACTIVE | Noted: 2024-03-01

## 2024-03-01 LAB
ABO GROUP (TYPE) IN BLOOD: NORMAL
ANTIBODY SCREEN: NORMAL
BILIRUBIN, POC: NEGATIVE
BLOOD URINE, POC: NEGATIVE
CLARITY, POC: CLEAR
COLOR, POC: YELLOW
ERYTHROCYTE [DISTWIDTH] IN BLOOD BY AUTOMATED COUNT: 14 % (ref 11.5–14.5)
GLUCOSE URINE, POC: NEGATIVE
HCT VFR BLD AUTO: 35.1 % (ref 36–46)
HGB BLD-MCNC: 11.5 G/DL (ref 12–16)
KETONES, POC: NEGATIVE
LEUKOCYTE EST, POC: NEGATIVE
MCH RBC QN AUTO: 31.7 PG (ref 26–34)
MCHC RBC AUTO-ENTMCNC: 32.8 G/DL (ref 32–36)
MCV RBC AUTO: 97 FL (ref 80–100)
NITRITE, POC: NEGATIVE
NRBC BLD-RTO: 0 /100 WBCS (ref 0–0)
PH, POC: 6.5
PLATELET # BLD AUTO: 248 X10*3/UL (ref 150–450)
POC APPEARANCE OF BODY FLUID: CLEAR
RBC # BLD AUTO: 3.63 X10*6/UL (ref 4–5.2)
RH FACTOR (ANTIGEN D): NORMAL
SPECIFIC GRAVITY, POC: 1.03
TREPONEMA PALLIDUM IGG+IGM AB [PRESENCE] IN SERUM OR PLASMA BY IMMUNOASSAY: NONREACTIVE
URINE PROTEIN, POC: NEGATIVE
UROBILINOGEN, POC: 1
WBC # BLD AUTO: 8.6 X10*3/UL (ref 4.4–11.3)

## 2024-03-01 PROCEDURE — 59025 FETAL NON-STRESS TEST: CPT | Performed by: STUDENT IN AN ORGANIZED HEALTH CARE EDUCATION/TRAINING PROGRAM

## 2024-03-01 PROCEDURE — 99214 OFFICE O/P EST MOD 30 MIN: CPT

## 2024-03-01 PROCEDURE — 86901 BLOOD TYPING SEROLOGIC RH(D): CPT

## 2024-03-01 PROCEDURE — 36415 COLL VENOUS BLD VENIPUNCTURE: CPT

## 2024-03-01 PROCEDURE — 85027 COMPLETE CBC AUTOMATED: CPT

## 2024-03-01 PROCEDURE — 99222 1ST HOSP IP/OBS MODERATE 55: CPT

## 2024-03-01 PROCEDURE — 1220000001 HC OB SEMI-PRIVATE ROOM DAILY

## 2024-03-01 PROCEDURE — 86780 TREPONEMA PALLIDUM: CPT

## 2024-03-01 PROCEDURE — 2500000001 HC RX 250 WO HCPCS SELF ADMINISTERED DRUGS (ALT 637 FOR MEDICARE OP)

## 2024-03-01 PROCEDURE — 99214 OFFICE O/P EST MOD 30 MIN: CPT | Performed by: NURSE PRACTITIONER

## 2024-03-01 PROCEDURE — 81002 URINALYSIS NONAUTO W/O SCOPE: CPT | Performed by: NURSE PRACTITIONER

## 2024-03-01 PROCEDURE — 59025 FETAL NON-STRESS TEST: CPT | Performed by: NURSE PRACTITIONER

## 2024-03-01 PROCEDURE — G0378 HOSPITAL OBSERVATION PER HR: HCPCS

## 2024-03-01 PROCEDURE — 99214 OFFICE O/P EST MOD 30 MIN: CPT | Mod: 25 | Performed by: NURSE PRACTITIONER

## 2024-03-01 RX ORDER — BISACODYL 10 MG/1
10 SUPPOSITORY RECTAL DAILY PRN
Status: DISCONTINUED | OUTPATIENT
Start: 2024-03-01 | End: 2024-03-02 | Stop reason: HOSPADM

## 2024-03-01 RX ORDER — HYDROXYZINE HYDROCHLORIDE 25 MG/1
25 TABLET, FILM COATED ORAL ONCE
Status: COMPLETED | OUTPATIENT
Start: 2024-03-01 | End: 2024-03-01

## 2024-03-01 RX ORDER — ONDANSETRON 4 MG/1
4 TABLET, FILM COATED ORAL EVERY 6 HOURS PRN
Status: DISCONTINUED | OUTPATIENT
Start: 2024-03-01 | End: 2024-03-02 | Stop reason: HOSPADM

## 2024-03-01 RX ORDER — LIDOCAINE HYDROCHLORIDE 10 MG/ML
0.5 INJECTION INFILTRATION; PERINEURAL ONCE AS NEEDED
Status: DISCONTINUED | OUTPATIENT
Start: 2024-03-01 | End: 2024-03-02 | Stop reason: HOSPADM

## 2024-03-01 RX ORDER — ADHESIVE BANDAGE
10 BANDAGE TOPICAL
Status: DISCONTINUED | OUTPATIENT
Start: 2024-03-01 | End: 2024-03-02 | Stop reason: HOSPADM

## 2024-03-01 RX ORDER — METOCLOPRAMIDE 10 MG/1
10 TABLET ORAL EVERY 6 HOURS PRN
Status: DISCONTINUED | OUTPATIENT
Start: 2024-03-01 | End: 2024-03-02 | Stop reason: HOSPADM

## 2024-03-01 RX ORDER — HYDROXYZINE HYDROCHLORIDE 25 MG/1
25 TABLET, FILM COATED ORAL EVERY 6 HOURS PRN
Status: DISCONTINUED | OUTPATIENT
Start: 2024-03-01 | End: 2024-03-02 | Stop reason: HOSPADM

## 2024-03-01 RX ORDER — SIMETHICONE 80 MG
80 TABLET,CHEWABLE ORAL 4 TIMES DAILY PRN
Status: DISCONTINUED | OUTPATIENT
Start: 2024-03-01 | End: 2024-03-02 | Stop reason: HOSPADM

## 2024-03-01 RX ORDER — POLYETHYLENE GLYCOL 3350 17 G/17G
17 POWDER, FOR SOLUTION ORAL 2 TIMES DAILY PRN
Status: DISCONTINUED | OUTPATIENT
Start: 2024-03-01 | End: 2024-03-02 | Stop reason: HOSPADM

## 2024-03-01 RX ORDER — ONDANSETRON HYDROCHLORIDE 2 MG/ML
4 INJECTION, SOLUTION INTRAVENOUS EVERY 6 HOURS PRN
Status: DISCONTINUED | OUTPATIENT
Start: 2024-03-01 | End: 2024-03-02 | Stop reason: HOSPADM

## 2024-03-01 RX ORDER — METOCLOPRAMIDE HYDROCHLORIDE 5 MG/ML
10 INJECTION INTRAMUSCULAR; INTRAVENOUS EVERY 6 HOURS PRN
Status: DISCONTINUED | OUTPATIENT
Start: 2024-03-01 | End: 2024-03-02 | Stop reason: HOSPADM

## 2024-03-01 RX ORDER — ACETAMINOPHEN 325 MG/1
975 TABLET ORAL EVERY 6 HOURS PRN
Status: DISCONTINUED | OUTPATIENT
Start: 2024-03-01 | End: 2024-03-02 | Stop reason: HOSPADM

## 2024-03-01 RX ORDER — SODIUM CHLORIDE, SODIUM LACTATE, POTASSIUM CHLORIDE, CALCIUM CHLORIDE 600; 310; 30; 20 MG/100ML; MG/100ML; MG/100ML; MG/100ML
125 INJECTION, SOLUTION INTRAVENOUS CONTINUOUS
Status: DISCONTINUED | OUTPATIENT
Start: 2024-03-01 | End: 2024-03-02 | Stop reason: HOSPADM

## 2024-03-01 RX ADMIN — HYDROXYZINE HYDROCHLORIDE 25 MG: 25 TABLET, FILM COATED ORAL at 18:24

## 2024-03-01 RX ADMIN — ACETAMINOPHEN 975 MG: 325 TABLET ORAL at 21:13

## 2024-03-01 SDOH — SOCIAL STABILITY: SOCIAL INSECURITY: ABUSE SCREEN: ADULT

## 2024-03-01 SDOH — HEALTH STABILITY: MENTAL HEALTH: NON-SPECIFIC ACTIVE SUICIDAL THOUGHTS (PAST 1 MONTH): NO

## 2024-03-01 SDOH — ECONOMIC STABILITY: HOUSING INSECURITY: DO YOU FEEL UNSAFE GOING BACK TO THE PLACE WHERE YOU ARE LIVING?: NO

## 2024-03-01 SDOH — HEALTH STABILITY: MENTAL HEALTH: HAVE YOU USED ANY SUBSTANCES (CANABIS, COCAINE, HEROIN, HALLUCINOGENS, INHALANTS, ETC.) IN THE PAST 12 MONTHS?: NO

## 2024-03-01 SDOH — SOCIAL STABILITY: SOCIAL INSECURITY: PHYSICAL ABUSE: DENIES

## 2024-03-01 SDOH — HEALTH STABILITY: MENTAL HEALTH: WISH TO BE DEAD (PAST 1 MONTH): NO

## 2024-03-01 SDOH — SOCIAL STABILITY: SOCIAL INSECURITY: VERBAL ABUSE: DENIES

## 2024-03-01 SDOH — HEALTH STABILITY: MENTAL HEALTH: WERE YOU ABLE TO COMPLETE ALL THE BEHAVIORAL HEALTH SCREENINGS?: YES

## 2024-03-01 SDOH — HEALTH STABILITY: MENTAL HEALTH: SUICIDAL BEHAVIOR (LIFETIME): NO

## 2024-03-01 SDOH — SOCIAL STABILITY: SOCIAL INSECURITY: HAVE YOU HAD THOUGHTS OF HARMING ANYONE ELSE?: NO

## 2024-03-01 SDOH — SOCIAL STABILITY: SOCIAL INSECURITY: DOES ANYONE TRY TO KEEP YOU FROM HAVING/CONTACTING OTHER FRIENDS OR DOING THINGS OUTSIDE YOUR HOME?: NO

## 2024-03-01 SDOH — SOCIAL STABILITY: SOCIAL INSECURITY: ARE YOU OR HAVE YOU BEEN THREATENED OR ABUSED PHYSICALLY, EMOTIONALLY, OR SEXUALLY BY ANYONE?: NO

## 2024-03-01 SDOH — HEALTH STABILITY: MENTAL HEALTH: HAVE YOU USED ANY PRESCRIPTION DRUGS OTHER THAN PRESCRIBED IN THE PAST 12 MONTHS?: NO

## 2024-03-01 SDOH — SOCIAL STABILITY: SOCIAL INSECURITY: HAS ANYONE EVER THREATENED TO HURT YOUR FAMILY OR YOUR PETS?: NO

## 2024-03-01 SDOH — SOCIAL STABILITY: SOCIAL INSECURITY: ARE THERE ANY APPARENT SIGNS OF INJURIES/BEHAVIORS THAT COULD BE RELATED TO ABUSE/NEGLECT?: NO

## 2024-03-01 SDOH — SOCIAL STABILITY: SOCIAL INSECURITY: DO YOU FEEL ANYONE HAS EXPLOITED OR TAKEN ADVANTAGE OF YOU FINANCIALLY OR OF YOUR PERSONAL PROPERTY?: NO

## 2024-03-01 ASSESSMENT — ACTIVITIES OF DAILY LIVING (ADL): LACK_OF_TRANSPORTATION: NO

## 2024-03-01 ASSESSMENT — PAIN SCALES - GENERAL
PAINLEVEL_OUTOF10: 0 - NO PAIN
PAINLEVEL_OUTOF10: 4
PAINLEVEL_OUTOF10: 4
PAINLEVEL_OUTOF10: 0 - NO PAIN

## 2024-03-01 ASSESSMENT — PAIN - FUNCTIONAL ASSESSMENT: PAIN_FUNCTIONAL_ASSESSMENT: 0-10

## 2024-03-01 ASSESSMENT — LIFESTYLE VARIABLES
HOW OFTEN DO YOU HAVE 6 OR MORE DRINKS ON ONE OCCASION: NEVER
AUDIT-C TOTAL SCORE: 0
AUDIT-C TOTAL SCORE: 0
SKIP TO QUESTIONS 9-10: 1
HOW MANY STANDARD DRINKS CONTAINING ALCOHOL DO YOU HAVE ON A TYPICAL DAY: PATIENT DOES NOT DRINK
HOW OFTEN DO YOU HAVE A DRINK CONTAINING ALCOHOL: NEVER

## 2024-03-01 ASSESSMENT — PATIENT HEALTH QUESTIONNAIRE - PHQ9
SUM OF ALL RESPONSES TO PHQ9 QUESTIONS 1 & 2: 0
1. LITTLE INTEREST OR PLEASURE IN DOING THINGS: NOT AT ALL
2. FEELING DOWN, DEPRESSED OR HOPELESS: NOT AT ALL

## 2024-03-01 ASSESSMENT — PAIN DESCRIPTION - LOCATION: LOCATION: HEAD

## 2024-03-01 NOTE — H&P
Obstetrical Admission History and Physical     Amaya Todd is a 33 y.o.  at 28w5d by LMP c/w 7wk US presents from office for variables on NST and irritability on TOCO, patient with known vasa previa.     Chief Complaint: NRFHT    Assessment/Plan      Vasa previa  - admit to mac 4 for observation in s/o contractions with vasa previa  - patient with no cramping or ctx initially, later with ctx on toco appreciated by patient  - no vaginal bleeding  - Diet ordered  - T&S, routine admission labs sent  - Udip without evidence of dehydration or UTI   - M aware  - needs consented    Anxiety/MWB  - tachycardic to 100- 110s, no chest pain, likely 2/2 significant anxiety at this time  - offered atarax prn for anxiety, patient initially declined  - atarax 25 mg x1 in triage for increased symptoms of anxiety after patient agreed to admission for observation  - PRN atarax order placed  - discussed delay in onset of effects of zoloft, patient declines initiation at this time  - emotional support provided    Fetal status  - , category I tracing, no decels  - 2 hrs of reassuring tracing  - s/p variable decels on office NST today  - presentation: cephalic on BSUS  - PNL UTD including 1hr OGT wnl    Dispo  - Admit to mac 4 for observation    Discussed admission with Dr. Lynch. [facility] for further management.  Autumn Brooks, APRN-CNP    Principal Problem:    Indication for care in labor and delivery, antepartum  Active Problems:    Generalized anxiety disorder    Vasa previa     uterine contractions, antepartum, third trimester      Pregnancy Problems (from 23 to present)       Problem Noted Resolved    Vasa previa 1/15/2024 by Meir Deutsch MD No    Priority:  Medium      26 weeks gestation of pregnancy 10/23/2023 by Meir Deutsch MD No    Priority:  Medium      Overview Addendum 2024  3:11 PM by Meir Deutsch MD     -- History of  x3: We will repeat, patient is anxious  about fourth   --History of MACROSOMIA 10 pounds 5 ounces: HgbA1c 5.5  --History of anxiety patient desires to observe   -- History of  kidney stones  --Family history of Down syndrome and muscular dystrophy: Patient declines cfDNA, cystic fibrosis, and SMA  --ULTRASOUND: Dating ultrasound on 10/6 at 7.1 weeks, normal NT on ; US  at 19 wks; US  with SUA, and vasa previa  -- VASA PREVIA: Noted on 20-week ultrasound on .  Plan delivery at 34 to 37 weeks a formal consult with MFM and repeat ultrasound scheduled for          Multigravida in second trimester 10/13/2023 by Raad Burgos MA No    Priority:  Medium            Subjective   Amaya is a  at 28w5d by LMP c/w 7wk US presents from the office for uterine irritability and variable decels on NST today. Denies ctx, VB, or LOF. Good fetal movement. Reports flatus and belching- patient think related to [employer] for breakfast. Patient feeling extremely anxious this entire pregnancy, tearful. Anxious about upcoming  section and  delivery.    Pregnancy notable for:  - vasa previa, for inpatient admission at 34-35wga until delivery  - h/o CS x3  - anxiety, started zoloft but not taking  - h/o nephrolithiasis prior to pregnancy       Obstetrical History   OB History    Para Term  AB Living   5 3 3 0 1 3   SAB IAB Ectopic Multiple Live Births   1 0 0 0 3      # Outcome Date GA Lbr Eulogio/2nd Weight Sex Delivery Anes PTL Lv   5 Current            4 Term  39w0d  3.997 kg M CS-LTranv   NELY   3 Term  39w0d  4.054 kg M CS-LTranv   NELY      Complications: Failure to Progress in First Stage   2 SAB 2019 8w0d             Birth Comments: D&C   1 Term  39w0d  4.678 kg M CS-LTranv   NELY       Past Medical History  Past Medical History:   Diagnosis Date    Anxiety     Kidney stones         Past Surgical History   Past Surgical History:   Procedure Laterality Date     SECTION, LOW TRANSVERSE   10/28/2022     section    DILATION AND CURETTAGE OF UTERUS  10/31/2019    Dilation and curettage    WISDOM TOOTH EXTRACTION  2020    [place] tooth extraction       Social History  Social History     Tobacco Use    Smoking status: Never    Smokeless tobacco: Never   Substance Use Topics    Alcohol use: Not Currently     Substance and Sexual Activity   Drug Use Never       Allergies  Patient has no known allergies.     Medications  Medications Prior to Admission   Medication Sig Dispense Refill Last Dose    calcium carbonate (Tums) 200 mg calcium chewable tablet Chew.       polyethylene glycol (Glycolax, Miralax) 17 gram packet Take 17 g by mouth once daily.       prenatal vit no.124-iron-folic (Prenatal Vitamin) 27 mg iron- 800 mcg tablet Take by mouth.          Objective    Last Vitals  Temp Pulse Resp BP MAP O2 Sat   36.8 °C (98.2 °F) (!) 119   139/81   98 %     Physical Examination  General: alert, conversational  HEENT: normocephalic, EOMI, clear sclera  Cardio: Warm and well perfused  Resp: breathing comfortably on room air  Abd: gravid, soft, nontender  Neuro: grossly intact, no focal deficits  Extremities: full ROM, no calf tenderness  Psych: A&O x3, extremely tearful    Non-Stress Test   Baseline Fetal Heart Rate for Non-Stress Test: 145 BPM  Variability in Waveform for Non-Stress Test: Moderate  Accelerations in Non-Stress Test: greater than/equal to 10 bpm, lasting at least 10 seconds  Decelerations in Non-Stress Test: None  Contractions in Non-Stress Test: Irregular  Acoustic Stimulator for Non-Stress Test: No  Interpretation of Non-Stress Test   Interpretation of Non-Stress Test: Reactive     Lab Review  Labs in chart were reviewed.

## 2024-03-01 NOTE — PROCEDURES
Amaya Todd, a  at 28w5d with an VINCENZO of 2024, by Last Menstrual Period, [facility].    Non-Stress Test   Baseline Fetal Heart Rate for Non-Stress Test: 150 BPM  Variability in Waveform for Non-Stress Test: Moderate  Accelerations in Non-Stress Test: Yes, greater than/equal to 10 bpm, lasting at least 10 seconds  Decelerations in Non-Stress Test: Variable (several presumed variables)  Contractions in Non-Stress Test: Irregular (iritability noted)  Acoustic Stimulator for Non-Stress Test: No  Interpretation of Non-Stress Test   Interpretation of Non-Stress Test: (!) Non-reactive  NST for Multiple Fetuses: No       Dispo: sent to triage for prolonged fetal monitoring    Joy Ames, APRN-CNP

## 2024-03-01 NOTE — PROGRESS NOTES
3/1/2024   Amaya Todd     MFM CONSULT NOTE  Referring Clinician: Meir Mir MD  Reason for consult: Vasa Previa    HPI: Amaya Todd is a 33 y.o.  at 26w1d here for prenatal MFM F/U for vasa previa    She is currently feeling well today without acute complaints.  Continues to have anxiety about the vasa previa.    10 point review of system is negative except as above      OBJECTIVE  Visit Vitals  /76   Wt 103 kg (228 lb)   LMP 2023   BMI 34.67 kg/m²   OB Status Pregnant   Smoking Status Never   BSA 2.22 m²     Maternal RHR- 115bpm  Gen-NAD  Cardiac- good peripheral perfusion  Respiratory- non-labored breathing  Abdomen- soft, non-tender, gravid   Extremities- symmetrical   Pelvic- deferred      NST non-reactive: Difficult to trace as fetus fell off monitor several times, but unable to determine fetus coming off vs decels down to about 120, noted subtle uterine irritability   B/L 150, moderate variability, + accels (10x10), decels difficult to evaluate but likely some with FHR into the 120s, toco: irritability noted    **Pt was counseled on above findings and recommended to report to [facility] for prolonged fetal monitoring      ASSESSMENT & PLAN    Amaya Todd is a 33 y.o.  at 28w5d here for the following:    #Vasa Previa  -Counseled at initial consult  -Plan for weekly NST  and serial cervical lengths  -Hospital admission at 34-35 wks with plan for delivery at that time     #Anxiety  -Counseled prior  -Previously discussed zoloft, but not currently on medications  -Very tearful in office today   -Will continue to assess    # History of kidney stones  - no active symptoms this pregnancy    #SUA  - serial growth US as above.    #PNC  - PNB reviewed and normal  - aneuploidy screening declined  - GDM screen/3rd tri labs ordered today  - s/p flu vacine  -TdaP next visit  -Serial cervical length and weekly NST until delivery.   -PNV x2 wks    Dispo: Pt sent to triage for prolonged  fetal monitoring given above NST reading and concerns for fetal and maternal safety. Attendings aware on L&D and MFM    CLARI Lehman-CNP  Maternal Fetal Medicine

## 2024-03-01 NOTE — H&P
Obstetrical Admission History and Physical - Triage     Amaya Todd is a 33 y.o.  at 28w5d by LMP c/w 7wk US presents from office for variables on NST and irritability on TOCO, patient with known vasa previa.     Chief Complaint: NRFHT    Assessment/Plan    NRFHT, vasa previa  - s/p variable decels on office NST today  - patient with no cramping or ctx, no vaginal bleeding, no ctx on toco   - for prolonged monitoring on L&D for at least 2 hours, tracing currently reassuring  - NPO in case of need for CS, last ate at 11am  - T&S sent  - Udip without evidence of dehydration or UTI, however can consider IVF if uterine irritability is seen     Anxiety  - tachycardic to 110s, no chest pain, likely 2/2 significant anxiety at this time  - offered atarax prn for anxiety at this time, declines  - discussed delay in onset of effects of zoloft    Fetal status  - for prolonged monitoring as above, NST reactive on arrival  - PNL UTD including 1hr OGT wnl    Discussed with Dr. Mcclendon, signed out to night team.     Val Guerra MD  PGY-2, Obstetrics and Gynecology        Active Problems:  There are no active Hospital Problems.      Pregnancy Problems (from 23 to present)       Problem Noted Resolved    Vasa previa 1/15/2024 by Meir Deutsch MD No    Priority:  Medium      26 weeks gestation of pregnancy 10/23/2023 by Meir Deutsch MD No    Priority:  Medium      Overview Addendum 2024  3:11 PM by Meir Deutsch MD     -- History of  x3: We will repeat, patient is anxious about fourth   --History of MACROSOMIA 10 pounds 5 ounces: HgbA1c 5.5  --History of anxiety patient desires to observe   -- History of  kidney stones  --Family history of Down syndrome and muscular dystrophy: Patient declines cfDNA, cystic fibrosis, and SMA  --ULTRASOUND: Dating ultrasound on 10/6 at 7.1 weeks, normal NT on ; US  at 19 wks; US  with SUA, and vasa previa  -- VASA PREVIA:  Noted on 20-week ultrasound on .  Plan delivery at 34 to 37 weeks a formal consult with MFM and repeat ultrasound scheduled for          Multigravida in second trimester 10/13/2023 by Raad Burgos MA No    Priority:  Medium            Subjective   Amaya is a  at 28w5d by LMP c/w 7wk US presents from the office for uterine irritability and variable decels on NST today. Denies any cramping/ctx, VB, or LOF. Good fetal movement. Patient feeling extremely anxious this entire pregnancy, tearful. Anxious about upcoming  section and  delivery.    Pregnancy notable for:  - vasa previa, for inpatient admission at 34-35wga until delivery  - h/o CS x3  - anxiety, started zoloft but not taking  - h/o nephrolithiasis prior to pregnancy       Obstetrical History   OB History    Para Term  AB Living   5 3 3 0 1 3   SAB IAB Ectopic Multiple Live Births   1 0 0 0 3      # Outcome Date GA Lbr Eulogio/2nd Weight Sex Delivery Anes PTL Lv   5 Current            4 Term  39w0d  3.997 kg M CS-LTranv   NELY   3 Term  39w0d  4.054 kg M CS-LTranv   NELY      Complications: Failure to Progress in First Stage   2 SAB 2019 8w0d             Birth Comments: D&C   1 Term  39w0d  4.678 kg M CS-LTranv   NELY       Past Medical History  Past Medical History:   Diagnosis Date    Anxiety     Kidney stones         Past Surgical History   Past Surgical History:   Procedure Laterality Date     SECTION, LOW TRANSVERSE  10/28/2022     section    DILATION AND CURETTAGE OF UTERUS  10/31/2019    Dilation and curettage    WISDOM TOOTH EXTRACTION  2020    [place] tooth extraction       Social History  Social History     Tobacco Use    Smoking status: Never    Smokeless tobacco: Never   Substance Use Topics    Alcohol use: Not Currently     Substance and Sexual Activity   Drug Use Never       Allergies  Patient has no known allergies.     Medications  Medications Prior to Admission    Medication Sig Dispense Refill Last Dose    calcium carbonate (Tums) 200 mg calcium chewable tablet Chew.       polyethylene glycol (Glycolax, Miralax) 17 gram packet Take 17 g by mouth once daily.       prenatal vit no.124-iron-folic (Prenatal Vitamin) 27 mg iron- 800 mcg tablet Take by mouth.          Objective    Last Vitals  Temp Pulse Resp BP MAP O2 Sat                   Physical Examination  General: alert, conversational  HEENT: normocephalic, EOMI, clear sclera  Cardio: Warm and well perfused  Resp: breathing comfortably on room air  Abd: gravid, soft, nontender  Neuro: grossly intact, no focal deficits  Extremities: full ROM, no calf tenderness  Psych: A&O x3, extremely tearful    Fetal Assessment  FHT: 140/mod/+accel/-decel  [place]: quiet    Lab Review  Labs in chart were reviewed.

## 2024-03-02 VITALS
BODY MASS INDEX: 34.56 KG/M2 | SYSTOLIC BLOOD PRESSURE: 130 MMHG | HEART RATE: 103 BPM | OXYGEN SATURATION: 96 % | WEIGHT: 228 LBS | RESPIRATION RATE: 20 BRPM | TEMPERATURE: 97.9 F | HEIGHT: 68 IN | DIASTOLIC BLOOD PRESSURE: 77 MMHG

## 2024-03-02 PROCEDURE — G0378 HOSPITAL OBSERVATION PER HR: HCPCS

## 2024-03-02 PROCEDURE — 99238 HOSP IP/OBS DSCHRG MGMT 30/<: CPT

## 2024-03-02 PROCEDURE — 59025 FETAL NON-STRESS TEST: CPT | Performed by: OBSTETRICS & GYNECOLOGY

## 2024-03-02 PROCEDURE — 2500000001 HC RX 250 WO HCPCS SELF ADMINISTERED DRUGS (ALT 637 FOR MEDICARE OP)

## 2024-03-02 RX ORDER — POLYETHYLENE GLYCOL 3350 17 G/17G
17 POWDER, FOR SOLUTION ORAL 2 TIMES DAILY PRN
Qty: 30 PACKET | Refills: 0 | Status: CANCELLED | OUTPATIENT
Start: 2024-03-02

## 2024-03-02 RX ADMIN — PRENATAL VIT W/ FE FUMARATE-FA TAB 27-0.8 MG 1 TABLET: 27-0.8 TAB at 08:29

## 2024-03-02 ASSESSMENT — PAIN SCALES - GENERAL: PAINLEVEL_OUTOF10: 0 - NO PAIN

## 2024-03-02 NOTE — CARE PLAN
The patient's goals for the shift include  to be discharged home    The clinical goals for the shift include to be discharged home    Over the shift, the patient did not make progress toward the following goals. Barriers to progression include none. Recommendations to address these barriers include none.

## 2024-03-02 NOTE — PROGRESS NOTES
[facility]   Maternal Fetal Medicine    Patient name: Amaya Todd  MRN: [id number]     Assessment & Plan  33 y.o.  at 28w6d with uterine irritability in setting of vasa previa. Pregnancy also complicated by 2 vessel cord.    Plan: home if repeat NST demonstrates no uterine activity    Uterine irritability without vaginal bleeding in setting of vasa previa  IV fluid hydration  Continue observation    Antepartum care  Rh positive  Normal 1h OGTT  MFM scan : vertex, anterior placenta with vasa previa, EFW 1121g (94th%)    Ofelia Magallanes MD  [facility]    Subjective: Overall doing ok. No vaginal bleeding, leakage of fluid, cramping, contractions. Reports appropriate fetal movement. Anxious to get home to her other children.    Objective:    Visit Vitals  /73   Pulse 98   Temp 36.3 °C (97.3 °F) (Temporal)   Resp 20       General: NAD  Mood/affect: appropriate  Resp: normal effort  Abd: soft, NDNT  Ext: no c/c/e    NST: Baseline 145, moderate BTBV, +accels, -decels  [place]: no contractions or uterine irritability

## 2024-03-02 NOTE — DISCHARGE SUMMARY
Discharge Summary    Admission Date: 3/1/2024  Discharge Date: 3/2/20    Discharge Diagnosis  Indication for care in labor and delivery, antepartum    Hospital Course  33 y.o.  at 28w6d with uterine irritability in setting of vasa previa. Pregnancy also complicated by 2 vessel cord. Pt had no bleeding or contractions after admission. NST is reactive and demonstrates no uterine activity 3/2/2024. MFM scan : vertex, anterior placenta with vasa previa, EFW 1121g (94th%). Pt has an appointment at 3/4 for US control at [facility]. Nurse is tasked for Ob follow up appointments     Seen and discussed with Dr. Magallanes.  Charlene Madera MD PGY1    Discharge Meds     Your medication list        ASK your doctor about these medications        Instructions Last Dose Given Next Dose Due   polyethylene glycol 17 gram packet  Commonly known as: Glycolax, Miralax           Prenatal Vitamin 27 mg iron- 800 mcg tablet  Generic drug: prenatal vit no.124-iron-folic           Tums 200 mg calcium chewable tablet  Generic drug: calcium carbonate                     Test Results Pending At Discharge  Pending Labs       No current pending labs.            Outpatient Follow-Up  Future Appointments   Date Time Provider Department Center   3/4/2024  9:30 AM [id number] OBGYNIMG ULTRASOUND 4 [id number]

## 2024-03-02 NOTE — DISCHARGE INSTRUCTIONS
Call if you have:  Temperature greater than 100.4  Persistent nausea and vomiting  Severe uncontrolled pain  Difficulty breathing, headache or visual disturbances  Hives  Persistent dizziness or light-headedness  Extreme fatigue  Any other questions or concerns you may have after discharge    In an emergency, call 911 or go to an Emergency Department at a nearby hospital    Angélicashon Ruelas has been emailed for a provider appointment.

## 2024-03-02 NOTE — HOSPITAL COURSE
33 y.o.  at 28w6d with uterine irritability in setting of vasa previa. Pregnancy also complicated by 2 vessel cord.     Plan: home if repeat NST demonstrates no uterine activity     Uterine irritability without vaginal bleeding in setting of vasa previa  IV fluid hydration  Continue observation     Antepartum care  Rh positive  Normal 1h OGTT  MFM scan : vertex, anterior placenta with vasa previa, EFW 1121g (94th%)

## 2024-03-02 NOTE — CARE PLAN
The patient's goals for the shift include      The clinical goals for the shift include to be discharged home    Over the shift, the patient did not make progress toward the following goals. Barriers to progression include none. Recommendations to address these barriers include none.

## 2024-03-02 NOTE — CARE PLAN
The patient's goals for the shift include  remain comfortable, get some rest, FHR WNL    The clinical goals for the shift include VSS, no s/sx of  labor

## 2024-03-04 ENCOUNTER — PROCEDURE VISIT (OUTPATIENT)
Dept: OBSTETRICS AND GYNECOLOGY | Facility: CLINIC | Age: 34
End: 2024-03-04
Payer: COMMERCIAL

## 2024-03-04 ENCOUNTER — ROUTINE PRENATAL (OUTPATIENT)
Dept: MATERNAL FETAL MEDICINE | Facility: CLINIC | Age: 34
End: 2024-03-04
Payer: COMMERCIAL

## 2024-03-04 ENCOUNTER — HOSPITAL ENCOUNTER (OUTPATIENT)
Dept: RADIOLOGY | Facility: CLINIC | Age: 34
Discharge: HOME | End: 2024-03-04
Payer: COMMERCIAL

## 2024-03-04 VITALS — DIASTOLIC BLOOD PRESSURE: 79 MMHG | SYSTOLIC BLOOD PRESSURE: 123 MMHG

## 2024-03-04 VITALS — SYSTOLIC BLOOD PRESSURE: 123 MMHG | DIASTOLIC BLOOD PRESSURE: 79 MMHG

## 2024-03-04 DIAGNOSIS — Z34.82 MULTIGRAVIDA IN SECOND TRIMESTER (HHS-HCC): ICD-10-CM

## 2024-03-04 DIAGNOSIS — F41.1 GENERALIZED ANXIETY DISORDER: ICD-10-CM

## 2024-03-04 DIAGNOSIS — Z3A.29 29 WEEKS GESTATION OF PREGNANCY (HHS-HCC): Primary | ICD-10-CM

## 2024-03-04 PROCEDURE — 99214 OFFICE O/P EST MOD 30 MIN: CPT | Performed by: OBSTETRICS & GYNECOLOGY

## 2024-03-04 PROCEDURE — 76818 FETAL BIOPHYS PROFILE W/NST: CPT | Performed by: OBSTETRICS & GYNECOLOGY

## 2024-03-04 PROCEDURE — 76817 TRANSVAGINAL US OBSTETRIC: CPT | Performed by: OBSTETRICS & GYNECOLOGY

## 2024-03-04 PROCEDURE — 76819 FETAL BIOPHYS PROFIL W/O NST: CPT

## 2024-03-04 PROCEDURE — 76816 OB US FOLLOW-UP PER FETUS: CPT | Performed by: OBSTETRICS & GYNECOLOGY

## 2024-03-04 PROCEDURE — 76817 TRANSVAGINAL US OBSTETRIC: CPT

## 2024-03-04 PROCEDURE — 76816 OB US FOLLOW-UP PER FETUS: CPT

## 2024-03-04 PROCEDURE — 76818 FETAL BIOPHYS PROFILE W/NST: CPT

## 2024-03-04 RX ORDER — ESCITALOPRAM OXALATE 5 MG/1
5 TABLET ORAL DAILY
Qty: 30 TABLET | Refills: 2 | Status: SHIPPED | OUTPATIENT
Start: 2024-03-04 | End: 2024-05-24 | Stop reason: WASHOUT

## 2024-03-04 RX ORDER — HYDROXYZINE HYDROCHLORIDE 10 MG/1
10 TABLET, FILM COATED ORAL EVERY 6 HOURS PRN
Qty: 30 TABLET | Refills: 0 | Status: SHIPPED | OUTPATIENT
Start: 2024-03-04 | End: 2024-04-18 | Stop reason: HOSPADM

## 2024-03-04 NOTE — PROGRESS NOTES
3/4/2024   Amaya Todd       HPI: Amaya Todd is a 33 y.o.  at 29w1d  here for prenatal MFM F/U for vasa previa    She is currently feeling well today without acute complaints.  Continues to have anxiety about the vasa previa.    10 point review of system is negative except as above      OBJECTIVE  Visit Vitals  LMP 2023   OB Status Pregnant   Smoking Status Never       Gen-NAD  Cardiac- good peripheral perfusion  Respiratory- non-labored breathing  Abdomen- soft, non-tender, gravid   Extremities- symmetrical   Pelvic- deferred      BPP/NST wnl.    ASSESSMENT & PLAN    Amaya Todd is a 33 y.o.  at 29w1d  here for the following:    #Vasa Previa  -Counseled at initial consult  -Plan for weekly NST and serial cervical lengths  -Hospital admission at 34-35 wks with plan for delivery at that time     #Anxiety  -Following with counselor  -Previously tried sertraline for 2 days but discontinued as she felt some mild agitation.  However notes it was mild enough that she would be open to trying a different mediation.  Will plan to start lexapro 45 mg/day and hydroxyzine PRN for agitation.    # History of kidney stones  - no active symptoms this pregnancy    #SUA  - serial growth US as above.    # Elevaed BP  - Initial BP mild range (141/87), repeat normotensive  - Suspect anxiety-related.  Will monitor.    #PNC  - PNB reviewed and normal  - aneuploidy screening declined  - GDM screen wnl  - 3rd tri labs wnl  - s/p flu vacine  -TdaP next visit  -Serial cervical length and weekly NST until delivery.   -PNV x1 week    Brien Melendrez MD  Maternal Fetal Medicine

## 2024-03-11 ENCOUNTER — APPOINTMENT (OUTPATIENT)
Dept: OBSTETRICS AND GYNECOLOGY | Facility: CLINIC | Age: 34
End: 2024-03-11
Payer: COMMERCIAL

## 2024-03-15 ENCOUNTER — HOSPITAL ENCOUNTER (OUTPATIENT)
Dept: RADIOLOGY | Facility: CLINIC | Age: 34
Discharge: HOME | End: 2024-03-15
Payer: COMMERCIAL

## 2024-03-15 ENCOUNTER — ROUTINE PRENATAL (OUTPATIENT)
Dept: MATERNAL FETAL MEDICINE | Facility: CLINIC | Age: 34
End: 2024-03-15
Payer: COMMERCIAL

## 2024-03-15 ENCOUNTER — PROCEDURE VISIT (OUTPATIENT)
Dept: OBSTETRICS AND GYNECOLOGY | Facility: CLINIC | Age: 34
End: 2024-03-15
Payer: COMMERCIAL

## 2024-03-15 VITALS — DIASTOLIC BLOOD PRESSURE: 82 MMHG | WEIGHT: 219.8 LBS | SYSTOLIC BLOOD PRESSURE: 135 MMHG | BODY MASS INDEX: 33.43 KG/M2

## 2024-03-15 DIAGNOSIS — Z3A.30 30 WEEKS GESTATION OF PREGNANCY (HHS-HCC): Primary | ICD-10-CM

## 2024-03-15 DIAGNOSIS — Z34.82 MULTIGRAVIDA IN SECOND TRIMESTER (HHS-HCC): ICD-10-CM

## 2024-03-15 DIAGNOSIS — F41.9 ANXIETY: ICD-10-CM

## 2024-03-15 DIAGNOSIS — Z23 NEED FOR TDAP VACCINATION: ICD-10-CM

## 2024-03-15 DIAGNOSIS — R35.0 URINARY FREQUENCY: ICD-10-CM

## 2024-03-15 DIAGNOSIS — O47.03 PRETERM UTERINE CONTRACTIONS, ANTEPARTUM, THIRD TRIMESTER (HHS-HCC): ICD-10-CM

## 2024-03-15 LAB
POC APPEARANCE, URINE: CLEAR
POC BILIRUBIN, URINE: NEGATIVE
POC BLOOD, URINE: ABNORMAL
POC COLOR, URINE: YELLOW
POC GLUCOSE, URINE: NEGATIVE MG/DL
POC KETONES, URINE: NEGATIVE MG/DL
POC LEUKOCYTES, URINE: NEGATIVE
POC NITRITE,URINE: NEGATIVE
POC PH, URINE: 6.5 PH
POC PROTEIN, URINE: NEGATIVE MG/DL
POC SPECIFIC GRAVITY, URINE: 1.02
POC UROBILINOGEN, URINE: 0.2 EU/DL

## 2024-03-15 PROCEDURE — 81003 URINALYSIS AUTO W/O SCOPE: CPT | Mod: 91 | Performed by: OBSTETRICS & GYNECOLOGY

## 2024-03-15 PROCEDURE — 99214 OFFICE O/P EST MOD 30 MIN: CPT | Performed by: OBSTETRICS & GYNECOLOGY

## 2024-03-15 PROCEDURE — 90471 IMMUNIZATION ADMIN: CPT | Performed by: OBSTETRICS & GYNECOLOGY

## 2024-03-15 PROCEDURE — 76819 FETAL BIOPHYS PROFIL W/O NST: CPT

## 2024-03-15 PROCEDURE — 99214 OFFICE O/P EST MOD 30 MIN: CPT | Mod: 25 | Performed by: OBSTETRICS & GYNECOLOGY

## 2024-03-15 PROCEDURE — 76817 TRANSVAGINAL US OBSTETRIC: CPT

## 2024-03-16 NOTE — PROGRESS NOTES
3/15/2024   Amaya Todd       HPI: Amaya Todd is a 33 y.o.  at 30w5d  here for prenatal MFM F/U for vasa previa    She is currently feeling well today without acute complaints. Having no side effects from lexapro, but still ahving anxiety/    10 point review of system is negative except as above      OBJECTIVE  Visit Vitals  /82   Wt 99.7 kg (219 lb 12.8 oz)   LMP 2023   BMI 33.43 kg/m²   OB Status Pregnant   Smoking Status Never   BSA 2.19 m²       Gen-NAD  Cardiac- good peripheral perfusion  Respiratory- non-labored breathing  Abdomen- soft, non-tender, gravid   Extremities- symmetrical   Pelvic- deferred      BPP/NST wnl.    ASSESSMENT & PLAN    Amaya Todd is a 33 y.o.  at 30w5d  here for the following:    #Vasa Previa  -Counseled at initial consult  -Plan for weekly NST and serial cervical lengths  -Hospital admission at 34-35 wks with plan for delivery at that time     #Anxiety  -Following with counselor  -On lexapro 5 mg/day and hydroxyzine PRN for agitation.  Will-reassess for lexapro up titration in 1-2 weeks    # History of kidney stones  - no active symptoms this pregnancy    #SUA  - serial growth US as above.    # Elevated BP  - One mild range BP at 29 weeks that was normal upon re-check  - Normotensive today    #PNC  - PNB reviewed and normal  - aneuploidy screening declined  - GDM screen wnl  - 3rd tri labs wnl  - s/p flu vacine  -TdaP today  -Serial cervical length and weekly NST until delivery.   -PNV x1 week    Brien Melendrez MD  Maternal Fetal Medicine

## 2024-03-22 ENCOUNTER — ROUTINE PRENATAL (OUTPATIENT)
Dept: MATERNAL FETAL MEDICINE | Facility: CLINIC | Age: 34
End: 2024-03-22
Payer: COMMERCIAL

## 2024-03-22 ENCOUNTER — HOSPITAL ENCOUNTER (OUTPATIENT)
Dept: RADIOLOGY | Facility: CLINIC | Age: 34
Discharge: HOME | End: 2024-03-22
Payer: COMMERCIAL

## 2024-03-22 ENCOUNTER — PROCEDURE VISIT (OUTPATIENT)
Dept: OBSTETRICS AND GYNECOLOGY | Facility: CLINIC | Age: 34
End: 2024-03-22
Payer: COMMERCIAL

## 2024-03-22 VITALS — DIASTOLIC BLOOD PRESSURE: 77 MMHG | SYSTOLIC BLOOD PRESSURE: 118 MMHG | WEIGHT: 221.7 LBS | BODY MASS INDEX: 33.72 KG/M2

## 2024-03-22 DIAGNOSIS — Z34.82 MULTIGRAVIDA IN SECOND TRIMESTER (HHS-HCC): ICD-10-CM

## 2024-03-22 DIAGNOSIS — O47.03 PRETERM UTERINE CONTRACTIONS, ANTEPARTUM, THIRD TRIMESTER (HHS-HCC): ICD-10-CM

## 2024-03-22 DIAGNOSIS — Z3A.26 26 WEEKS GESTATION OF PREGNANCY (HHS-HCC): ICD-10-CM

## 2024-03-22 DIAGNOSIS — Z3A.29 29 WEEKS GESTATION OF PREGNANCY (HHS-HCC): ICD-10-CM

## 2024-03-22 DIAGNOSIS — Z3A.31 31 WEEKS GESTATION OF PREGNANCY (HHS-HCC): Primary | ICD-10-CM

## 2024-03-22 DIAGNOSIS — Z3A.30 30 WEEKS GESTATION OF PREGNANCY (HHS-HCC): ICD-10-CM

## 2024-03-22 DIAGNOSIS — R35.0 URINARY FREQUENCY: ICD-10-CM

## 2024-03-22 LAB
POC APPEARANCE, URINE: CLEAR
POC BILIRUBIN, URINE: NEGATIVE
POC BLOOD, URINE: NEGATIVE
POC COLOR, URINE: YELLOW
POC GLUCOSE, URINE: NEGATIVE MG/DL
POC KETONES, URINE: NEGATIVE MG/DL
POC LEUKOCYTES, URINE: NEGATIVE
POC NITRITE,URINE: NEGATIVE
POC PH, URINE: 5.5 PH
POC PROTEIN, URINE: NEGATIVE MG/DL
POC SPECIFIC GRAVITY, URINE: 1.01
POC UROBILINOGEN, URINE: 0.2 EU/DL

## 2024-03-22 PROCEDURE — 99214 OFFICE O/P EST MOD 30 MIN: CPT | Performed by: STUDENT IN AN ORGANIZED HEALTH CARE EDUCATION/TRAINING PROGRAM

## 2024-03-22 PROCEDURE — 76817 TRANSVAGINAL US OBSTETRIC: CPT

## 2024-03-22 PROCEDURE — 76818 FETAL BIOPHYS PROFILE W/NST: CPT

## 2024-03-22 PROCEDURE — 81003 URINALYSIS AUTO W/O SCOPE: CPT | Mod: 91 | Performed by: STUDENT IN AN ORGANIZED HEALTH CARE EDUCATION/TRAINING PROGRAM

## 2024-03-22 PROCEDURE — 87086 URINE CULTURE/COLONY COUNT: CPT | Performed by: STUDENT IN AN ORGANIZED HEALTH CARE EDUCATION/TRAINING PROGRAM

## 2024-03-22 NOTE — PROGRESS NOTES
HPI: Amaya Todd is a 33 y.o.  at 31w5d here for prenatal MFM F/U for vasa previa     Amaya is doing well. She denies LOF or VB. Minimal CTX. No other concerns.    O: /77   Wt 101 kg (221 lb 11.2 oz)   LMP 2023   BMI 33.72 kg/m²   Gen: NAD  Resp: nonlabored breathing  Cardiac: good peripheral perfusion  Abd: gravid  Psych: appropriate mood and affect  FHTs: +FCA on U/S    ASSESSMENT & PLAN     Amaya Todd is a 33 y.o.  at 31w5d  here for the following:     #Vasa Previa  -Counseled at initial consult  -Plan for weekly NST and serial cervical lengths. Normal today  -Hospital admission at 34-35 wks with plan for delivery at that time      #Anxiety  -Following with counselor  -On lexapro 5 mg/day and hydroxyzine PRN for agitation.     # History of kidney stones  - no active symptoms this pregnancy     #SUA  - serial growth US  - last growth @ 30w5d showed an EFW of 1656 g (91%)     # Elevated BP  - One mild range BP at 29 weeks that was normal upon re-check  - Normotensive today     #PNC  - PNB reviewed and normal  - aneuploidy screening declined  - GDM screen wnl  - 3rd tri labs wnl  - s/p flu vaccine and TDAP  - Serial cervical length and weekly NST until delivery.   - PNV x1 week     Jed Dominguez MD  Maternal-Fetal Medicine

## 2024-03-24 LAB — BACTERIA UR CULT: ABNORMAL

## 2024-03-25 ENCOUNTER — APPOINTMENT (OUTPATIENT)
Dept: OBSTETRICS AND GYNECOLOGY | Facility: CLINIC | Age: 34
End: 2024-03-25
Payer: COMMERCIAL

## 2024-03-26 ENCOUNTER — APPOINTMENT (OUTPATIENT)
Dept: RADIOLOGY | Facility: CLINIC | Age: 34
End: 2024-03-26
Payer: COMMERCIAL

## 2024-03-28 NOTE — PROGRESS NOTES
HPI: Amaya Todd is a 33 y.o.  at 32w5d here for prenatal MFM F/U for vasa previa     Amaya is doing well. She denies LOF or VB. Minimal CTX. No other concerns.     O: /81   Wt 102 kg (224 lb 1.6 oz)   LMP 2023   BMI 34.08 kg/m²   Gen: NAD  Resp: nonlabored breathing  Cardiac: good peripheral perfusion  Abd: gravid  Psych: appropriate mood and affect  FHTs: +FCA on U/S     ASSESSMENT & PLAN     Amaya Todd is a 33 y.o.  at 32w5d  here for the following:     #Vasa Previa  -Counseled at initial consult  -Plan for weekly NST and serial cervical lengths. Normal today  -Hospital admission at 34-35 wks with plan for delivery at that time      #Anxiety  -Following with counselor  -On lexapro 5 mg/day and hydroxyzine PRN for agitation.     # History of kidney stones  -no active symptoms this pregnancy     #SUA  -serial growth US  -last growth @ 30w5d showed an EFW of 1656 g (91%)     # Elevated BP  -One mild range BP at 29 weeks that was normal upon re-check  -Normotensive today     #PNC  -PNB reviewed and normal  -aneuploidy screening declined  -GDM screen wnl  -3rd tri labs wnl  -s/p flu vaccine and TDAP  -Serial cervical length and weekly NST until delivery  -Growth today showed an EFW of 2638 g (97%)  -PNV x1 week     Jed Dominguez MD  Maternal-Fetal Medicine

## 2024-03-29 ENCOUNTER — HOSPITAL ENCOUNTER (OUTPATIENT)
Dept: RADIOLOGY | Facility: CLINIC | Age: 34
Discharge: HOME | End: 2024-03-29
Payer: COMMERCIAL

## 2024-03-29 ENCOUNTER — ROUTINE PRENATAL (OUTPATIENT)
Dept: MATERNAL FETAL MEDICINE | Facility: CLINIC | Age: 34
End: 2024-03-29
Payer: COMMERCIAL

## 2024-03-29 ENCOUNTER — PROCEDURE VISIT (OUTPATIENT)
Dept: OBSTETRICS AND GYNECOLOGY | Facility: CLINIC | Age: 34
End: 2024-03-29
Payer: COMMERCIAL

## 2024-03-29 VITALS — WEIGHT: 224.1 LBS | BODY MASS INDEX: 34.08 KG/M2 | SYSTOLIC BLOOD PRESSURE: 130 MMHG | DIASTOLIC BLOOD PRESSURE: 81 MMHG

## 2024-03-29 DIAGNOSIS — Z3A.32 32 WEEKS GESTATION OF PREGNANCY (HHS-HCC): Primary | ICD-10-CM

## 2024-03-29 DIAGNOSIS — O47.03 PRETERM UTERINE CONTRACTIONS, ANTEPARTUM, THIRD TRIMESTER (HHS-HCC): ICD-10-CM

## 2024-03-29 DIAGNOSIS — Z34.82 MULTIGRAVIDA IN SECOND TRIMESTER (HHS-HCC): ICD-10-CM

## 2024-03-29 LAB
POC APPEARANCE, URINE: CLEAR
POC BILIRUBIN, URINE: NEGATIVE
POC BLOOD, URINE: NEGATIVE
POC COLOR, URINE: YELLOW
POC GLUCOSE, URINE: NEGATIVE MG/DL
POC KETONES, URINE: NEGATIVE MG/DL
POC LEUKOCYTES, URINE: NEGATIVE
POC NITRITE,URINE: NEGATIVE
POC PH, URINE: 7 PH
POC PROTEIN, URINE: NEGATIVE MG/DL
POC SPECIFIC GRAVITY, URINE: 1.02
POC UROBILINOGEN, URINE: 0.2 EU/DL

## 2024-03-29 PROCEDURE — 99214 OFFICE O/P EST MOD 30 MIN: CPT | Performed by: STUDENT IN AN ORGANIZED HEALTH CARE EDUCATION/TRAINING PROGRAM

## 2024-03-29 PROCEDURE — 76816 OB US FOLLOW-UP PER FETUS: CPT

## 2024-03-29 PROCEDURE — 76817 TRANSVAGINAL US OBSTETRIC: CPT

## 2024-03-29 PROCEDURE — 76819 FETAL BIOPHYS PROFIL W/O NST: CPT

## 2024-03-29 PROCEDURE — 81003 URINALYSIS AUTO W/O SCOPE: CPT | Mod: 91 | Performed by: STUDENT IN AN ORGANIZED HEALTH CARE EDUCATION/TRAINING PROGRAM

## 2024-04-05 ENCOUNTER — ROUTINE PRENATAL (OUTPATIENT)
Dept: MATERNAL FETAL MEDICINE | Facility: CLINIC | Age: 34
End: 2024-04-05
Payer: COMMERCIAL

## 2024-04-05 ENCOUNTER — HOSPITAL ENCOUNTER (OUTPATIENT)
Dept: RADIOLOGY | Facility: CLINIC | Age: 34
Discharge: HOME | End: 2024-04-05
Payer: COMMERCIAL

## 2024-04-05 ENCOUNTER — PROCEDURE VISIT (OUTPATIENT)
Dept: OBSTETRICS AND GYNECOLOGY | Facility: CLINIC | Age: 34
End: 2024-04-05
Payer: COMMERCIAL

## 2024-04-05 VITALS — BODY MASS INDEX: 33.92 KG/M2 | SYSTOLIC BLOOD PRESSURE: 133 MMHG | WEIGHT: 223 LBS | DIASTOLIC BLOOD PRESSURE: 82 MMHG

## 2024-04-05 DIAGNOSIS — F41.9 ANXIETY DURING PREGNANCY IN THIRD TRIMESTER, ANTEPARTUM (HHS-HCC): ICD-10-CM

## 2024-04-05 DIAGNOSIS — O34.219 UTERINE SCAR FROM PREVIOUS CESAREAN DELIVERY COMPLICATING PREGNANCY (HHS-HCC): ICD-10-CM

## 2024-04-05 DIAGNOSIS — O99.343 ANXIETY DURING PREGNANCY IN THIRD TRIMESTER, ANTEPARTUM (HHS-HCC): ICD-10-CM

## 2024-04-05 DIAGNOSIS — O09.899 SINGLE UMBILICAL ARTERY AFFECTING MANAGEMENT OF MOTHER IN SINGLETON PREGNANCY, ANTEPARTUM (HHS-HCC): ICD-10-CM

## 2024-04-05 DIAGNOSIS — Z3A.33 33 WEEKS GESTATION OF PREGNANCY (HHS-HCC): ICD-10-CM

## 2024-04-05 LAB
POC APPEARANCE, URINE: CLEAR
POC BILIRUBIN, URINE: NEGATIVE
POC BLOOD, URINE: ABNORMAL
POC COLOR, URINE: YELLOW
POC GLUCOSE, URINE: NEGATIVE MG/DL
POC KETONES, URINE: NEGATIVE MG/DL
POC LEUKOCYTES, URINE: NEGATIVE
POC NITRITE,URINE: NEGATIVE
POC PH, URINE: 7 PH
POC PROTEIN, URINE: NEGATIVE MG/DL
POC SPECIFIC GRAVITY, URINE: 1.01
POC UROBILINOGEN, URINE: 0.2 EU/DL

## 2024-04-05 PROCEDURE — 76817 TRANSVAGINAL US OBSTETRIC: CPT | Performed by: OBSTETRICS & GYNECOLOGY

## 2024-04-05 PROCEDURE — 76819 FETAL BIOPHYS PROFIL W/O NST: CPT

## 2024-04-05 PROCEDURE — 81003 URINALYSIS AUTO W/O SCOPE: CPT | Mod: 91 | Performed by: OBSTETRICS & GYNECOLOGY

## 2024-04-05 PROCEDURE — 87081 CULTURE SCREEN ONLY: CPT | Performed by: OBSTETRICS & GYNECOLOGY

## 2024-04-05 PROCEDURE — 76818 FETAL BIOPHYS PROFILE W/NST: CPT

## 2024-04-05 PROCEDURE — 76818 FETAL BIOPHYS PROFILE W/NST: CPT | Performed by: OBSTETRICS & GYNECOLOGY

## 2024-04-05 PROCEDURE — 99214 OFFICE O/P EST MOD 30 MIN: CPT | Performed by: OBSTETRICS & GYNECOLOGY

## 2024-04-05 NOTE — PROGRESS NOTES
HPI: mAaya Todd is a 33 y.o.  at 33w5d here for prenatal MFM F/U for vasa previa     Amaya is doing well. She denies LOF or VB. Minimal CTX. No other concerns.     O: Visit Vitals  /82   Wt 101 kg (223 lb)   LMP 2023   BMI 33.92 kg/m²   OB Status Pregnant   Smoking Status Never   BSA 2.2 m²      Gen- NAD  Abd- gravid, nontender  Ext- symmetrical, nontender  Pelvic- GBS collected    FHTs: +FCA on U/S    NST: reactive, no contractions     ASSESSMENT & PLAN     Amaya Todd is a 33 y.o.  at 33w5d  here for the following:     #Vasa Previa  -Counseled at initial consult  -Plan for weekly NST and serial cervical lengths. Normal today, CL > 4cm  -Declines prolonged hospitalization, scheduled for delivery at 35w on .      #Anxiety  -Following with counselor  -On lexapro 5 mg/day and hydroxyzine PRN for agitation.     # History of kidney stones  -no active symptoms this pregnancy     #SUA  -serial growth US  -last growth @ 30w5d showed an EFW of 2639 g (97%)     # Elevated BP  -One mild range BP at 29 weeks that was normal upon re-check  -Normotensive today    # 3 prior CD's  - Aware of increased risk of operative complications, invasive placental disease.      #PNC  -PNB reviewed and normal  -aneuploidy screening declined  -GDM screen wnl  -3rd tri labs wnl  -s/p flu vaccine and TDAP  -Serial cervical length and weekly NST until delivery  -PNV x1 week     Catherine Hartley MD  Maternal Fetal Medicine  Director of Fetal Intervention

## 2024-04-08 ENCOUNTER — APPOINTMENT (OUTPATIENT)
Dept: OBSTETRICS AND GYNECOLOGY | Facility: CLINIC | Age: 34
End: 2024-04-08
Payer: COMMERCIAL

## 2024-04-08 LAB — GP B STREP GENITAL QL CULT: NORMAL

## 2024-04-12 ENCOUNTER — LAB REQUISITION (OUTPATIENT)
Dept: LAB | Facility: HOSPITAL | Age: 34
End: 2024-04-12
Payer: COMMERCIAL

## 2024-04-12 ENCOUNTER — HOSPITAL ENCOUNTER (OUTPATIENT)
Dept: RADIOLOGY | Facility: CLINIC | Age: 34
Discharge: HOME | End: 2024-04-12
Payer: COMMERCIAL

## 2024-04-12 ENCOUNTER — PROCEDURE VISIT (OUTPATIENT)
Dept: OBSTETRICS AND GYNECOLOGY | Facility: CLINIC | Age: 34
End: 2024-04-12
Payer: COMMERCIAL

## 2024-04-12 ENCOUNTER — LAB (OUTPATIENT)
Dept: LAB | Facility: LAB | Age: 34
End: 2024-04-12
Payer: COMMERCIAL

## 2024-04-12 ENCOUNTER — ROUTINE PRENATAL (OUTPATIENT)
Dept: MATERNAL FETAL MEDICINE | Facility: CLINIC | Age: 34
End: 2024-04-12
Payer: COMMERCIAL

## 2024-04-12 VITALS — DIASTOLIC BLOOD PRESSURE: 82 MMHG | WEIGHT: 225.7 LBS | SYSTOLIC BLOOD PRESSURE: 124 MMHG | BODY MASS INDEX: 34.33 KG/M2

## 2024-04-12 DIAGNOSIS — Z3A.26 26 WEEKS GESTATION OF PREGNANCY (HHS-HCC): ICD-10-CM

## 2024-04-12 DIAGNOSIS — R35.0 URINARY FREQUENCY: ICD-10-CM

## 2024-04-12 DIAGNOSIS — Z34.82 MULTIGRAVIDA IN SECOND TRIMESTER (HHS-HCC): ICD-10-CM

## 2024-04-12 DIAGNOSIS — F41.1 GENERALIZED ANXIETY DISORDER: ICD-10-CM

## 2024-04-12 DIAGNOSIS — Z3A.29 29 WEEKS GESTATION OF PREGNANCY (HHS-HCC): ICD-10-CM

## 2024-04-12 DIAGNOSIS — Z3A.30 30 WEEKS GESTATION OF PREGNANCY (HHS-HCC): ICD-10-CM

## 2024-04-12 DIAGNOSIS — O47.03 PRETERM UTERINE CONTRACTIONS, ANTEPARTUM, THIRD TRIMESTER (HHS-HCC): ICD-10-CM

## 2024-04-12 DIAGNOSIS — Z3A.34 34 WEEKS GESTATION OF PREGNANCY (HHS-HCC): Primary | ICD-10-CM

## 2024-04-12 LAB
ABO GROUP (TYPE) IN BLOOD: NORMAL
ANTIBODY SCREEN: NORMAL
ERYTHROCYTE [DISTWIDTH] IN BLOOD BY AUTOMATED COUNT: 13.9 % (ref 11.5–14.5)
HCT VFR BLD AUTO: 33.9 % (ref 36–46)
HGB BLD-MCNC: 11.7 G/DL (ref 12–16)
MCH RBC QN AUTO: 32.4 PG (ref 26–34)
MCHC RBC AUTO-ENTMCNC: 34.5 G/DL (ref 32–36)
MCV RBC AUTO: 94 FL (ref 80–100)
NRBC BLD-RTO: 0 /100 WBCS (ref 0–0)
PLATELET # BLD AUTO: 198 X10*3/UL (ref 150–450)
POC APPEARANCE, URINE: CLEAR
POC BILIRUBIN, URINE: NEGATIVE
POC BLOOD, URINE: NEGATIVE
POC COLOR, URINE: YELLOW
POC GLUCOSE, URINE: NEGATIVE MG/DL
POC KETONES, URINE: NEGATIVE MG/DL
POC LEUKOCYTES, URINE: NEGATIVE
POC NITRITE,URINE: NEGATIVE
POC PH, URINE: 6.5 PH
POC PROTEIN, URINE: NEGATIVE MG/DL
POC SPECIFIC GRAVITY, URINE: 1.01
POC UROBILINOGEN, URINE: 0.2 EU/DL
RBC # BLD AUTO: 3.61 X10*6/UL (ref 4–5.2)
RH FACTOR (ANTIGEN D): NORMAL
WBC # BLD AUTO: 7.9 X10*3/UL (ref 4.4–11.3)

## 2024-04-12 PROCEDURE — 85027 COMPLETE CBC AUTOMATED: CPT

## 2024-04-12 PROCEDURE — 2500000004 HC RX 250 GENERAL PHARMACY W/ HCPCS (ALT 636 FOR OP/ED): Performed by: OBSTETRICS & GYNECOLOGY

## 2024-04-12 PROCEDURE — 76817 TRANSVAGINAL US OBSTETRIC: CPT

## 2024-04-12 PROCEDURE — 96372 THER/PROPH/DIAG INJ SC/IM: CPT | Performed by: OBSTETRICS & GYNECOLOGY

## 2024-04-12 PROCEDURE — 76819 FETAL BIOPHYS PROFIL W/O NST: CPT | Performed by: STUDENT IN AN ORGANIZED HEALTH CARE EDUCATION/TRAINING PROGRAM

## 2024-04-12 PROCEDURE — 86900 BLOOD TYPING SEROLOGIC ABO: CPT

## 2024-04-12 PROCEDURE — 76817 TRANSVAGINAL US OBSTETRIC: CPT | Performed by: STUDENT IN AN ORGANIZED HEALTH CARE EDUCATION/TRAINING PROGRAM

## 2024-04-12 PROCEDURE — 86850 RBC ANTIBODY SCREEN: CPT

## 2024-04-12 PROCEDURE — 86901 BLOOD TYPING SEROLOGIC RH(D): CPT

## 2024-04-12 PROCEDURE — 99214 OFFICE O/P EST MOD 30 MIN: CPT | Mod: 25 | Performed by: OBSTETRICS & GYNECOLOGY

## 2024-04-12 PROCEDURE — 99214 OFFICE O/P EST MOD 30 MIN: CPT | Performed by: OBSTETRICS & GYNECOLOGY

## 2024-04-12 PROCEDURE — 36415 COLL VENOUS BLD VENIPUNCTURE: CPT

## 2024-04-12 PROCEDURE — 76819 FETAL BIOPHYS PROFIL W/O NST: CPT

## 2024-04-12 PROCEDURE — 81003 URINALYSIS AUTO W/O SCOPE: CPT | Mod: QW | Performed by: OBSTETRICS & GYNECOLOGY

## 2024-04-12 PROCEDURE — 86920 COMPATIBILITY TEST SPIN: CPT

## 2024-04-12 RX ORDER — BETAMETHASONE SODIUM PHOSPHATE AND BETAMETHASONE ACETATE 3; 3 MG/ML; MG/ML
6 INJECTION, SUSPENSION INTRA-ARTICULAR; INTRALESIONAL; INTRAMUSCULAR; SOFT TISSUE ONCE
Status: COMPLETED | OUTPATIENT
Start: 2024-04-12 | End: 2024-04-12

## 2024-04-12 RX ADMIN — BETAMETHASONE SODIUM PHOSPHATE AND BETAMETHASONE ACETATE 6 MG: 3; 3 INJECTION, SUSPENSION INTRA-ARTICULAR; INTRALESIONAL; INTRAMUSCULAR at 10:15

## 2024-04-12 NOTE — PROGRESS NOTES
HPI: Amaya Todd is a 33 y.o.  at 34w5d here for prenatal MFM F/U for vasa previa     Amaya is doing well. She denies LOF or VB. Minimal CTX. No other concerns.  Feeling anxious about CS.     O: Visit Vitals  /82   Wt 102 kg (225 lb 11.2 oz)   LMP 2023   BMI 34.33 kg/m²   OB Status Pregnant   Smoking Status Never   BSA 2.21 m²      Gen- NAD  Abd- gravid, nontender  Ext- symmetrical, nontender  Pelvic- GBS collected    FHTs: BPP 10/10    NST: reactive, no contractions     ASSESSMENT & PLAN     Amaya Todd is a 33 y.o.  at 34w5d  here for the following:     #Vasa Previa  -Counseled at initial consult  -Plan for weekly NST and serial cervical lengths. Normal today, CL > 4cm  -Scheduled for delivery at 35w on .   -BMZ today and tomorrow.     #Anxiety  -Following with counselor  -On lexapro 5 mg/day and hydroxyzine PRN for agitation.     # History of kidney stones  -no active symptoms this pregnancy     #SUA  -serial growth US  -last growth @ 30w5d showed an EFW of 2639 g (97%)     # Elevated BP  -One mild range BP at 29 weeks that was normal upon re-check  -Normotensive today    # 3 prior CD's  - Aware of increased risk of operative complications, invasive placental disease.      #PNC  -PNB reviewed and normal  -aneuploidy screening declined  -GDM screen wnl  -3rd tri labs wnl  -s/p flu vaccine and TDAP  - edliver .    Brien Melendrez MD

## 2024-04-14 ENCOUNTER — ANESTHESIA (OUTPATIENT)
Dept: OBSTETRICS AND GYNECOLOGY | Facility: HOSPITAL | Age: 34
End: 2024-04-14
Payer: COMMERCIAL

## 2024-04-14 ENCOUNTER — ANESTHESIA EVENT (OUTPATIENT)
Dept: OBSTETRICS AND GYNECOLOGY | Facility: HOSPITAL | Age: 34
End: 2024-04-14
Payer: COMMERCIAL

## 2024-04-14 ENCOUNTER — HOSPITAL ENCOUNTER (INPATIENT)
Facility: HOSPITAL | Age: 34
LOS: 4 days | Discharge: HOME | End: 2024-04-18
Attending: OBSTETRICS & GYNECOLOGY | Admitting: OBSTETRICS & GYNECOLOGY
Payer: COMMERCIAL

## 2024-04-14 PROBLEM — D64.9 ANEMIA: Status: ACTIVE | Noted: 2024-04-14

## 2024-04-14 PROBLEM — N20.0 RENAL CALCULI: Status: ACTIVE | Noted: 2024-04-14

## 2024-04-14 LAB
ABO GROUP (TYPE) IN BLOOD: NORMAL
ANTIBODY SCREEN: NORMAL
APTT PPP: 23 SECONDS (ref 27–38)
BASE EXCESS BLDCOA CALC-SCNC: -1.5 MMOL/L (ref -10.8–-0.5)
BASE EXCESS BLDCOV CALC-SCNC: -1.5 MMOL/L (ref -8.1–-0.5)
BODY TEMPERATURE: 37 DEGREES CELSIUS
BODY TEMPERATURE: 37 DEGREES CELSIUS
ERYTHROCYTE [DISTWIDTH] IN BLOOD BY AUTOMATED COUNT: 14.1 % (ref 11.5–14.5)
ERYTHROCYTE [DISTWIDTH] IN BLOOD BY AUTOMATED COUNT: 15 % (ref 11.5–14.5)
FIBRINOGEN PPP-MCNC: 262 MG/DL (ref 200–400)
HCO3 BLDCOA-SCNC: 23.7 MMOL/L (ref 15–29)
HCO3 BLDCOV-SCNC: 21.1 MMOL/L (ref 16–26)
HCT VFR BLD AUTO: 27.9 % (ref 36–46)
HCT VFR BLD AUTO: 31.5 % (ref 36–46)
HGB BLD-MCNC: 10.5 G/DL (ref 12–16)
HGB BLD-MCNC: 9.4 G/DL (ref 12–16)
HOLD SPECIMEN: NORMAL
INR PPP: 1 (ref 0.9–1.1)
MCH RBC QN AUTO: 31.3 PG (ref 26–34)
MCH RBC QN AUTO: 32 PG (ref 26–34)
MCHC RBC AUTO-ENTMCNC: 33.3 G/DL (ref 32–36)
MCHC RBC AUTO-ENTMCNC: 33.7 G/DL (ref 32–36)
MCV RBC AUTO: 94 FL (ref 80–100)
MCV RBC AUTO: 95 FL (ref 80–100)
NRBC BLD-RTO: 0 /100 WBCS (ref 0–0)
NRBC BLD-RTO: 0 /100 WBCS (ref 0–0)
OXYHGB MFR BLDCOA: NORMAL %
OXYHGB MFR BLDCOV: 73.2 % (ref 94–98)
PCO2 BLDCOA: 41 MM HG (ref 31–75)
PCO2 BLDCOV: 29 MM HG (ref 22–53)
PH BLDCOA: 7.37 PH (ref 7.08–7.39)
PH BLDCOV: 7.47 PH (ref 7.19–7.47)
PLATELET # BLD AUTO: 185 X10*3/UL (ref 150–450)
PLATELET # BLD AUTO: 202 X10*3/UL (ref 150–450)
PO2 BLDCOA: 19 MM HG (ref 5–31)
PO2 BLDCOV: 36 MM HG (ref 13–37)
PROTHROMBIN TIME: 11.3 SECONDS (ref 9.8–12.8)
RBC # BLD AUTO: 2.94 X10*6/UL (ref 4–5.2)
RBC # BLD AUTO: 3.36 X10*6/UL (ref 4–5.2)
RH FACTOR (ANTIGEN D): NORMAL
SAO2 % BLDCOA: NORMAL %
SAO2 % BLDCOV: 75 % (ref 16–84)
TREPONEMA PALLIDUM IGG+IGM AB [PRESENCE] IN SERUM OR PLASMA BY IMMUNOASSAY: NONREACTIVE
WBC # BLD AUTO: 10 X10*3/UL (ref 4.4–11.3)
WBC # BLD AUTO: 13 X10*3/UL (ref 4.4–11.3)

## 2024-04-14 PROCEDURE — 85027 COMPLETE CBC AUTOMATED: CPT | Performed by: STUDENT IN AN ORGANIZED HEALTH CARE EDUCATION/TRAINING PROGRAM

## 2024-04-14 PROCEDURE — 1210000001 HC SEMI-PRIVATE ROOM DAILY

## 2024-04-14 PROCEDURE — 51702 INSERT TEMP BLADDER CATH: CPT

## 2024-04-14 PROCEDURE — 88307 TISSUE EXAM BY PATHOLOGIST: CPT | Mod: TC,SUR | Performed by: STUDENT IN AN ORGANIZED HEALTH CARE EDUCATION/TRAINING PROGRAM

## 2024-04-14 PROCEDURE — 3700000014 HC AN EPIDURAL BLOCK CHARGE: Performed by: OBSTETRICS & GYNECOLOGY

## 2024-04-14 PROCEDURE — 86901 BLOOD TYPING SEROLOGIC RH(D): CPT | Performed by: STUDENT IN AN ORGANIZED HEALTH CARE EDUCATION/TRAINING PROGRAM

## 2024-04-14 PROCEDURE — 2500000005 HC RX 250 GENERAL PHARMACY W/O HCPCS: Performed by: STUDENT IN AN ORGANIZED HEALTH CARE EDUCATION/TRAINING PROGRAM

## 2024-04-14 PROCEDURE — 88307 TISSUE EXAM BY PATHOLOGIST: CPT | Performed by: PATHOLOGY

## 2024-04-14 PROCEDURE — 85384 FIBRINOGEN ACTIVITY: CPT | Performed by: STUDENT IN AN ORGANIZED HEALTH CARE EDUCATION/TRAINING PROGRAM

## 2024-04-14 PROCEDURE — 59514 CESAREAN DELIVERY ONLY: CPT | Performed by: STUDENT IN AN ORGANIZED HEALTH CARE EDUCATION/TRAINING PROGRAM

## 2024-04-14 PROCEDURE — 2500000001 HC RX 250 WO HCPCS SELF ADMINISTERED DRUGS (ALT 637 FOR MEDICARE OP): Performed by: STUDENT IN AN ORGANIZED HEALTH CARE EDUCATION/TRAINING PROGRAM

## 2024-04-14 PROCEDURE — 36430 TRANSFUSION BLD/BLD COMPNT: CPT | Mod: GC | Performed by: STUDENT IN AN ORGANIZED HEALTH CARE EDUCATION/TRAINING PROGRAM

## 2024-04-14 PROCEDURE — 3700000018 HC OB ANESTHESIA C-SECTION: Performed by: OBSTETRICS & GYNECOLOGY

## 2024-04-14 PROCEDURE — P9045 ALBUMIN (HUMAN), 5%, 250 ML: HCPCS | Mod: JZ | Performed by: STUDENT IN AN ORGANIZED HEALTH CARE EDUCATION/TRAINING PROGRAM

## 2024-04-14 PROCEDURE — 59514 CESAREAN DELIVERY ONLY: CPT | Performed by: OBSTETRICS & GYNECOLOGY

## 2024-04-14 PROCEDURE — 2500000004 HC RX 250 GENERAL PHARMACY W/ HCPCS (ALT 636 FOR OP/ED): Performed by: STUDENT IN AN ORGANIZED HEALTH CARE EDUCATION/TRAINING PROGRAM

## 2024-04-14 PROCEDURE — 85610 PROTHROMBIN TIME: CPT | Performed by: STUDENT IN AN ORGANIZED HEALTH CARE EDUCATION/TRAINING PROGRAM

## 2024-04-14 PROCEDURE — 82805 BLOOD GASES W/O2 SATURATION: CPT

## 2024-04-14 PROCEDURE — 7100000016 HC LABOR RECOVERY PER HOUR: Performed by: OBSTETRICS & GYNECOLOGY

## 2024-04-14 PROCEDURE — 36415 COLL VENOUS BLD VENIPUNCTURE: CPT | Performed by: STUDENT IN AN ORGANIZED HEALTH CARE EDUCATION/TRAINING PROGRAM

## 2024-04-14 PROCEDURE — P9016 RBC LEUKOCYTES REDUCED: HCPCS

## 2024-04-14 PROCEDURE — 86780 TREPONEMA PALLIDUM: CPT | Performed by: STUDENT IN AN ORGANIZED HEALTH CARE EDUCATION/TRAINING PROGRAM

## 2024-04-14 PROCEDURE — 2720000007 HC OR 272 NO HCPCS: Performed by: OBSTETRICS & GYNECOLOGY

## 2024-04-14 PROCEDURE — 01961 ANES CESAREAN DELIVERY ONLY: CPT | Performed by: ANESTHESIOLOGY

## 2024-04-14 RX ORDER — LABETALOL HYDROCHLORIDE 5 MG/ML
20 INJECTION, SOLUTION INTRAVENOUS ONCE AS NEEDED
Status: DISCONTINUED | OUTPATIENT
Start: 2024-04-14 | End: 2024-04-18 | Stop reason: HOSPADM

## 2024-04-14 RX ORDER — SIMETHICONE 80 MG
80 TABLET,CHEWABLE ORAL 4 TIMES DAILY PRN
Status: DISCONTINUED | OUTPATIENT
Start: 2024-04-14 | End: 2024-04-18 | Stop reason: HOSPADM

## 2024-04-14 RX ORDER — HYDRALAZINE HYDROCHLORIDE 20 MG/ML
5 INJECTION INTRAMUSCULAR; INTRAVENOUS ONCE AS NEEDED
Status: DISCONTINUED | OUTPATIENT
Start: 2024-04-14 | End: 2024-04-18 | Stop reason: HOSPADM

## 2024-04-14 RX ORDER — HYDROMORPHONE HYDROCHLORIDE 1 MG/ML
INJECTION, SOLUTION INTRAMUSCULAR; INTRAVENOUS; SUBCUTANEOUS AS NEEDED
Status: DISCONTINUED | OUTPATIENT
Start: 2024-04-14 | End: 2024-04-14

## 2024-04-14 RX ORDER — MIDAZOLAM HYDROCHLORIDE 1 MG/ML
INJECTION, SOLUTION INTRAMUSCULAR; INTRAVENOUS AS NEEDED
Status: DISCONTINUED | OUTPATIENT
Start: 2024-04-14 | End: 2024-04-14

## 2024-04-14 RX ORDER — TRANEXAMIC ACID 100 MG/ML
1000 INJECTION, SOLUTION INTRAVENOUS ONCE AS NEEDED
Status: DISCONTINUED | OUTPATIENT
Start: 2024-04-14 | End: 2024-04-18 | Stop reason: HOSPADM

## 2024-04-14 RX ORDER — BISACODYL 10 MG/1
10 SUPPOSITORY RECTAL DAILY PRN
Status: DISCONTINUED | OUTPATIENT
Start: 2024-04-14 | End: 2024-04-18 | Stop reason: HOSPADM

## 2024-04-14 RX ORDER — PROPOFOL 10 MG/ML
INJECTION, EMULSION INTRAVENOUS CONTINUOUS PRN
Status: DISCONTINUED | OUTPATIENT
Start: 2024-04-14 | End: 2024-04-14

## 2024-04-14 RX ORDER — ONDANSETRON 4 MG/1
4 TABLET, FILM COATED ORAL EVERY 6 HOURS PRN
Status: DISCONTINUED | OUTPATIENT
Start: 2024-04-14 | End: 2024-04-18 | Stop reason: HOSPADM

## 2024-04-14 RX ORDER — CEFAZOLIN 1 G/1
INJECTION, POWDER, FOR SOLUTION INTRAVENOUS AS NEEDED
Status: DISCONTINUED | OUTPATIENT
Start: 2024-04-14 | End: 2024-04-14

## 2024-04-14 RX ORDER — NIFEDIPINE 10 MG/1
10 CAPSULE ORAL ONCE AS NEEDED
Status: DISCONTINUED | OUTPATIENT
Start: 2024-04-14 | End: 2024-04-18 | Stop reason: HOSPADM

## 2024-04-14 RX ORDER — IBUPROFEN 600 MG/1
600 TABLET ORAL EVERY 6 HOURS
Status: DISCONTINUED | OUTPATIENT
Start: 2024-04-15 | End: 2024-04-16

## 2024-04-14 RX ORDER — METHYLERGONOVINE MALEATE 0.2 MG/ML
0.2 INJECTION INTRAVENOUS ONCE AS NEEDED
Status: DISCONTINUED | OUTPATIENT
Start: 2024-04-14 | End: 2024-04-18 | Stop reason: HOSPADM

## 2024-04-14 RX ORDER — MISOPROSTOL 200 UG/1
800 TABLET ORAL ONCE AS NEEDED
Status: DISCONTINUED | OUTPATIENT
Start: 2024-04-14 | End: 2024-04-18 | Stop reason: HOSPADM

## 2024-04-14 RX ORDER — HYDROMORPHONE HYDROCHLORIDE 1 MG/ML
0.2 INJECTION, SOLUTION INTRAMUSCULAR; INTRAVENOUS; SUBCUTANEOUS EVERY 5 MIN PRN
Status: DISCONTINUED | OUTPATIENT
Start: 2024-04-14 | End: 2024-04-18 | Stop reason: HOSPADM

## 2024-04-14 RX ORDER — ACETAMINOPHEN 325 MG/1
975 TABLET ORAL EVERY 6 HOURS
Status: DISCONTINUED | OUTPATIENT
Start: 2024-04-14 | End: 2024-04-16

## 2024-04-14 RX ORDER — HYDRALAZINE HYDROCHLORIDE 20 MG/ML
5 INJECTION INTRAMUSCULAR; INTRAVENOUS ONCE AS NEEDED
Status: DISCONTINUED | OUTPATIENT
Start: 2024-04-14 | End: 2024-04-14 | Stop reason: HOSPADM

## 2024-04-14 RX ORDER — TRANEXAMIC ACID 100 MG/ML
1000 INJECTION, SOLUTION INTRAVENOUS ONCE AS NEEDED
Status: DISCONTINUED | OUTPATIENT
Start: 2024-04-14 | End: 2024-04-14 | Stop reason: HOSPADM

## 2024-04-14 RX ORDER — ACETAMINOPHEN 120 MG/1
SUPPOSITORY RECTAL AS NEEDED
Status: DISCONTINUED | OUTPATIENT
Start: 2024-04-14 | End: 2024-04-14

## 2024-04-14 RX ORDER — CARBOPROST TROMETHAMINE 250 UG/ML
250 INJECTION, SOLUTION INTRAMUSCULAR ONCE AS NEEDED
Status: DISCONTINUED | OUTPATIENT
Start: 2024-04-14 | End: 2024-04-18 | Stop reason: HOSPADM

## 2024-04-14 RX ORDER — ONDANSETRON HYDROCHLORIDE 2 MG/ML
4 INJECTION, SOLUTION INTRAVENOUS EVERY 6 HOURS PRN
Status: DISCONTINUED | OUTPATIENT
Start: 2024-04-14 | End: 2024-04-18 | Stop reason: HOSPADM

## 2024-04-14 RX ORDER — PROPOFOL 10 MG/ML
INJECTION, EMULSION INTRAVENOUS AS NEEDED
Status: DISCONTINUED | OUTPATIENT
Start: 2024-04-14 | End: 2024-04-14

## 2024-04-14 RX ORDER — OXYTOCIN 10 [USP'U]/ML
10 INJECTION, SOLUTION INTRAMUSCULAR; INTRAVENOUS ONCE AS NEEDED
Status: DISCONTINUED | OUTPATIENT
Start: 2024-04-14 | End: 2024-04-14 | Stop reason: HOSPADM

## 2024-04-14 RX ORDER — OXYCODONE HYDROCHLORIDE 5 MG/1
10 TABLET ORAL EVERY 4 HOURS PRN
Status: DISCONTINUED | OUTPATIENT
Start: 2024-04-15 | End: 2024-04-18 | Stop reason: HOSPADM

## 2024-04-14 RX ORDER — LABETALOL HYDROCHLORIDE 5 MG/ML
20 INJECTION, SOLUTION INTRAVENOUS ONCE AS NEEDED
Status: DISCONTINUED | OUTPATIENT
Start: 2024-04-14 | End: 2024-04-14 | Stop reason: HOSPADM

## 2024-04-14 RX ORDER — MORPHINE SULFATE 2 MG/ML
INJECTION, SOLUTION INTRAMUSCULAR; INTRAVENOUS AS NEEDED
Status: DISCONTINUED | OUTPATIENT
Start: 2024-04-14 | End: 2024-04-14

## 2024-04-14 RX ORDER — LOPERAMIDE HYDROCHLORIDE 2 MG/1
4 CAPSULE ORAL EVERY 2 HOUR PRN
Status: DISCONTINUED | OUTPATIENT
Start: 2024-04-14 | End: 2024-04-18 | Stop reason: HOSPADM

## 2024-04-14 RX ORDER — OXYTOCIN 10 [USP'U]/ML
10 INJECTION, SOLUTION INTRAMUSCULAR; INTRAVENOUS ONCE AS NEEDED
Status: DISCONTINUED | OUTPATIENT
Start: 2024-04-14 | End: 2024-04-18 | Stop reason: HOSPADM

## 2024-04-14 RX ORDER — CARBOPROST TROMETHAMINE 250 UG/ML
250 INJECTION, SOLUTION INTRAMUSCULAR ONCE AS NEEDED
Status: DISCONTINUED | OUTPATIENT
Start: 2024-04-14 | End: 2024-04-14 | Stop reason: HOSPADM

## 2024-04-14 RX ORDER — OXYTOCIN/0.9 % SODIUM CHLORIDE 30/500 ML
60 PLASTIC BAG, INJECTION (ML) INTRAVENOUS ONCE AS NEEDED
Status: DISCONTINUED | OUTPATIENT
Start: 2024-04-14 | End: 2024-04-18 | Stop reason: HOSPADM

## 2024-04-14 RX ORDER — METHYLERGONOVINE MALEATE 0.2 MG/ML
0.2 INJECTION INTRAVENOUS ONCE AS NEEDED
Status: DISCONTINUED | OUTPATIENT
Start: 2024-04-14 | End: 2024-04-14 | Stop reason: HOSPADM

## 2024-04-14 RX ORDER — DIPHENHYDRAMINE HCL 25 MG
25 CAPSULE ORAL EVERY 4 HOURS PRN
Status: DISCONTINUED | OUTPATIENT
Start: 2024-04-14 | End: 2024-04-18 | Stop reason: HOSPADM

## 2024-04-14 RX ORDER — ONDANSETRON 4 MG/1
4 TABLET, FILM COATED ORAL EVERY 6 HOURS PRN
Status: DISCONTINUED | OUTPATIENT
Start: 2024-04-14 | End: 2024-04-14

## 2024-04-14 RX ORDER — NORETHINDRONE AND ETHINYL ESTRADIOL 0.5-0.035
KIT ORAL CONTINUOUS PRN
Status: DISCONTINUED | OUTPATIENT
Start: 2024-04-14 | End: 2024-04-14

## 2024-04-14 RX ORDER — KETOROLAC TROMETHAMINE 30 MG/ML
INJECTION, SOLUTION INTRAMUSCULAR; INTRAVENOUS AS NEEDED
Status: DISCONTINUED | OUTPATIENT
Start: 2024-04-14 | End: 2024-04-14

## 2024-04-14 RX ORDER — METOCLOPRAMIDE 10 MG/1
10 TABLET ORAL EVERY 6 HOURS PRN
Status: DISCONTINUED | OUTPATIENT
Start: 2024-04-14 | End: 2024-04-14

## 2024-04-14 RX ORDER — ONDANSETRON HYDROCHLORIDE 2 MG/ML
4 INJECTION, SOLUTION INTRAVENOUS EVERY 6 HOURS PRN
Status: DISCONTINUED | OUTPATIENT
Start: 2024-04-14 | End: 2024-04-14

## 2024-04-14 RX ORDER — BUTORPHANOL TARTRATE 2 MG/ML
1 INJECTION INTRAMUSCULAR; INTRAVENOUS
Status: DISCONTINUED | OUTPATIENT
Start: 2024-04-14 | End: 2024-04-18 | Stop reason: HOSPADM

## 2024-04-14 RX ORDER — METOCLOPRAMIDE HYDROCHLORIDE 5 MG/ML
10 INJECTION INTRAMUSCULAR; INTRAVENOUS EVERY 6 HOURS PRN
Status: DISCONTINUED | OUTPATIENT
Start: 2024-04-14 | End: 2024-04-14

## 2024-04-14 RX ORDER — ENOXAPARIN SODIUM 100 MG/ML
40 INJECTION SUBCUTANEOUS EVERY 24 HOURS
Status: DISCONTINUED | OUTPATIENT
Start: 2024-04-15 | End: 2024-04-17

## 2024-04-14 RX ORDER — DIPHENHYDRAMINE HYDROCHLORIDE 50 MG/ML
25 INJECTION INTRAMUSCULAR; INTRAVENOUS EVERY 4 HOURS PRN
Status: DISCONTINUED | OUTPATIENT
Start: 2024-04-14 | End: 2024-04-18 | Stop reason: HOSPADM

## 2024-04-14 RX ORDER — TERBUTALINE SULFATE 1 MG/ML
0.25 INJECTION SUBCUTANEOUS ONCE AS NEEDED
Status: DISCONTINUED | OUTPATIENT
Start: 2024-04-14 | End: 2024-04-14 | Stop reason: HOSPADM

## 2024-04-14 RX ORDER — NALOXONE HYDROCHLORIDE 0.4 MG/ML
0.1 INJECTION, SOLUTION INTRAMUSCULAR; INTRAVENOUS; SUBCUTANEOUS EVERY 5 MIN PRN
Status: DISCONTINUED | OUTPATIENT
Start: 2024-04-14 | End: 2024-04-18 | Stop reason: HOSPADM

## 2024-04-14 RX ORDER — POLYETHYLENE GLYCOL 3350 17 G/17G
17 POWDER, FOR SOLUTION ORAL 2 TIMES DAILY PRN
Status: DISCONTINUED | OUTPATIENT
Start: 2024-04-14 | End: 2024-04-18 | Stop reason: HOSPADM

## 2024-04-14 RX ORDER — SODIUM CHLORIDE, SODIUM LACTATE, POTASSIUM CHLORIDE, CALCIUM CHLORIDE 600; 310; 30; 20 MG/100ML; MG/100ML; MG/100ML; MG/100ML
125 INJECTION, SOLUTION INTRAVENOUS CONTINUOUS
Status: DISCONTINUED | OUTPATIENT
Start: 2024-04-14 | End: 2024-04-14

## 2024-04-14 RX ORDER — OXYTOCIN/0.9 % SODIUM CHLORIDE 30/500 ML
60 PLASTIC BAG, INJECTION (ML) INTRAVENOUS ONCE AS NEEDED
Status: DISCONTINUED | OUTPATIENT
Start: 2024-04-14 | End: 2024-04-14 | Stop reason: HOSPADM

## 2024-04-14 RX ORDER — ADHESIVE BANDAGE
10 BANDAGE TOPICAL
Status: DISCONTINUED | OUTPATIENT
Start: 2024-04-14 | End: 2024-04-18 | Stop reason: HOSPADM

## 2024-04-14 RX ORDER — NIFEDIPINE 10 MG/1
10 CAPSULE ORAL ONCE AS NEEDED
Status: DISCONTINUED | OUTPATIENT
Start: 2024-04-14 | End: 2024-04-14 | Stop reason: HOSPADM

## 2024-04-14 RX ORDER — PHENYLEPHRINE 10 MG/250 ML(40 MCG/ML)IN 0.9 % SOD.CHLORIDE INTRAVENOUS
CONTINUOUS PRN
Status: DISCONTINUED | OUTPATIENT
Start: 2024-04-14 | End: 2024-04-14

## 2024-04-14 RX ORDER — KETOROLAC TROMETHAMINE 30 MG/ML
30 INJECTION, SOLUTION INTRAMUSCULAR; INTRAVENOUS EVERY 6 HOURS
Status: COMPLETED | OUTPATIENT
Start: 2024-04-14 | End: 2024-04-15

## 2024-04-14 RX ORDER — MISOPROSTOL 200 UG/1
800 TABLET ORAL ONCE AS NEEDED
Status: DISCONTINUED | OUTPATIENT
Start: 2024-04-14 | End: 2024-04-14 | Stop reason: HOSPADM

## 2024-04-14 RX ORDER — ALBUMIN HUMAN 50 G/1000ML
SOLUTION INTRAVENOUS AS NEEDED
Status: DISCONTINUED | OUTPATIENT
Start: 2024-04-14 | End: 2024-04-14

## 2024-04-14 RX ORDER — SODIUM CHLORIDE, SODIUM LACTATE, POTASSIUM CHLORIDE, CALCIUM CHLORIDE 600; 310; 30; 20 MG/100ML; MG/100ML; MG/100ML; MG/100ML
INJECTION, SOLUTION INTRAVENOUS CONTINUOUS PRN
Status: DISCONTINUED | OUTPATIENT
Start: 2024-04-14 | End: 2024-04-14

## 2024-04-14 RX ORDER — LIDOCAINE 560 MG/1
1 PATCH PERCUTANEOUS; TOPICAL; TRANSDERMAL
Status: DISCONTINUED | OUTPATIENT
Start: 2024-04-14 | End: 2024-04-18 | Stop reason: HOSPADM

## 2024-04-14 RX ORDER — OXYCODONE HYDROCHLORIDE 5 MG/1
5 TABLET ORAL EVERY 4 HOURS PRN
Status: DISCONTINUED | OUTPATIENT
Start: 2024-04-15 | End: 2024-04-18 | Stop reason: HOSPADM

## 2024-04-14 RX ORDER — ESCITALOPRAM OXALATE 5 MG/1
5 TABLET ORAL DAILY
Status: DISCONTINUED | OUTPATIENT
Start: 2024-04-14 | End: 2024-04-18 | Stop reason: HOSPADM

## 2024-04-14 RX ORDER — MIDAZOLAM HYDROCHLORIDE 1 MG/ML
INJECTION INTRAMUSCULAR; INTRAVENOUS AS NEEDED
Status: DISCONTINUED | OUTPATIENT
Start: 2024-04-14 | End: 2024-04-14

## 2024-04-14 RX ORDER — MORPHINE SULFATE 0.5 MG/ML
INJECTION, SOLUTION EPIDURAL; INTRATHECAL; INTRAVENOUS AS NEEDED
Status: DISCONTINUED | OUTPATIENT
Start: 2024-04-14 | End: 2024-04-14

## 2024-04-14 RX ORDER — LIDOCAINE HYDROCHLORIDE 10 MG/ML
30 INJECTION INFILTRATION; PERINEURAL ONCE AS NEEDED
Status: DISCONTINUED | OUTPATIENT
Start: 2024-04-14 | End: 2024-04-14 | Stop reason: HOSPADM

## 2024-04-14 RX ORDER — LOPERAMIDE HYDROCHLORIDE 2 MG/1
4 CAPSULE ORAL EVERY 2 HOUR PRN
Status: DISCONTINUED | OUTPATIENT
Start: 2024-04-14 | End: 2024-04-14 | Stop reason: HOSPADM

## 2024-04-14 RX ADMIN — Medication 10 MG: at 10:46

## 2024-04-14 RX ADMIN — PROPOFOL 50 MCG/KG/MIN: 10 INJECTION, EMULSION INTRAVENOUS at 11:12

## 2024-04-14 RX ADMIN — KETOROLAC TROMETHAMINE 30 MG: 30 INJECTION INTRAMUSCULAR; INTRAVENOUS at 18:04

## 2024-04-14 RX ADMIN — PROPOFOL 20 MG: 10 INJECTION, EMULSION INTRAVENOUS at 11:10

## 2024-04-14 RX ADMIN — SIMETHICONE 80 MG: 80 TABLET, CHEWABLE ORAL at 23:49

## 2024-04-14 RX ADMIN — MORPHINE SULFATE 0.05 MG: 0.5 INJECTION EPIDURAL; INTRATHECAL; INTRAVENOUS at 11:29

## 2024-04-14 RX ADMIN — PROPOFOL 20 MG: 10 INJECTION, EMULSION INTRAVENOUS at 11:12

## 2024-04-14 RX ADMIN — CEFAZOLIN 2 G: 1 INJECTION, POWDER, FOR SOLUTION INTRAMUSCULAR; INTRAVENOUS at 10:42

## 2024-04-14 RX ADMIN — ALBUMIN (HUMAN) 250 ML: 12.5 SOLUTION INTRAVENOUS at 11:08

## 2024-04-14 RX ADMIN — DEXAMETHASONE SODIUM PHOSPHATE 4 MG: 4 INJECTION, SOLUTION INTRAMUSCULAR; INTRAVENOUS at 09:38

## 2024-04-14 RX ADMIN — ACETAMINOPHEN 650 MG: 120 SUPPOSITORY RECTAL at 11:18

## 2024-04-14 RX ADMIN — Medication 30 MG: at 10:42

## 2024-04-14 RX ADMIN — HYDROMORPHONE HYDROCHLORIDE 0.5 MG: 1 INJECTION, SOLUTION INTRAMUSCULAR; INTRAVENOUS; SUBCUTANEOUS at 10:55

## 2024-04-14 RX ADMIN — Medication 0.49 MCG/KG/MIN: at 09:23

## 2024-04-14 RX ADMIN — Medication 20 MG: at 10:34

## 2024-04-14 RX ADMIN — Medication 20 MG: at 10:30

## 2024-04-14 RX ADMIN — HYDROMORPHONE HYDROCHLORIDE 0.5 MG: 1 INJECTION, SOLUTION INTRAMUSCULAR; INTRAVENOUS; SUBCUTANEOUS at 11:01

## 2024-04-14 RX ADMIN — Medication 10 MG: at 10:35

## 2024-04-14 RX ADMIN — ACETAMINOPHEN 975 MG: 325 TABLET ORAL at 23:49

## 2024-04-14 RX ADMIN — KETOROLAC TROMETHAMINE 30 MG: 30 INJECTION INTRAMUSCULAR; INTRAVENOUS at 23:49

## 2024-04-14 RX ADMIN — ACETAMINOPHEN 975 MG: 325 TABLET ORAL at 18:04

## 2024-04-14 RX ADMIN — KETOROLAC TROMETHAMINE 30 MG: 30 INJECTION, SOLUTION INTRAMUSCULAR at 11:18

## 2024-04-14 RX ADMIN — SODIUM CHLORIDE, POTASSIUM CHLORIDE, SODIUM LACTATE AND CALCIUM CHLORIDE 125 ML/HR: 600; 310; 30; 20 INJECTION, SOLUTION INTRAVENOUS at 08:40

## 2024-04-14 RX ADMIN — MIDAZOLAM HYDROCHLORIDE 2 MG: 1 INJECTION, SOLUTION INTRAMUSCULAR; INTRAVENOUS at 10:42

## 2024-04-14 RX ADMIN — SODIUM CHLORIDE, POTASSIUM CHLORIDE, SODIUM LACTATE AND CALCIUM CHLORIDE: 600; 310; 30; 20 INJECTION, SOLUTION INTRAVENOUS at 09:36

## 2024-04-14 RX ADMIN — MIDAZOLAM 2 MG: 1 INJECTION INTRAMUSCULAR; INTRAVENOUS at 11:02

## 2024-04-14 RX ADMIN — Medication 0.97 MCG/KG/MIN: at 09:35

## 2024-04-14 RX ADMIN — CEFAZOLIN 2 G: 1 INJECTION, POWDER, FOR SOLUTION INTRAMUSCULAR; INTRAVENOUS at 09:37

## 2024-04-14 RX ADMIN — OXYTOCIN 600 MILLI-UNITS/MIN: 10 INJECTION, SOLUTION INTRAMUSCULAR; INTRAVENOUS at 10:39

## 2024-04-14 RX ADMIN — Medication 10 MG: at 10:55

## 2024-04-14 SDOH — HEALTH STABILITY: MENTAL HEALTH: CURRENT SMOKER: 0

## 2024-04-14 SDOH — HEALTH STABILITY: MENTAL HEALTH: WISH TO BE DEAD (PAST 1 MONTH): NO

## 2024-04-14 SDOH — SOCIAL STABILITY: SOCIAL INSECURITY: ARE YOU OR HAVE YOU BEEN THREATENED OR ABUSED PHYSICALLY, EMOTIONALLY, OR SEXUALLY BY ANYONE?: NO

## 2024-04-14 SDOH — ECONOMIC STABILITY: FOOD INSECURITY: WITHIN THE PAST 12 MONTHS, YOU WORRIED THAT YOUR FOOD WOULD RUN OUT BEFORE YOU GOT MONEY TO BUY MORE.: NEVER TRUE

## 2024-04-14 SDOH — ECONOMIC STABILITY: FOOD INSECURITY

## 2024-04-14 SDOH — ECONOMIC STABILITY: TRANSPORTATION INSECURITY: IN THE PAST 12 MONTHS, HAS LACK OF TRANSPORTATION KEPT YOU FROM MEDICAL APPOINTMENTS OR FROM GETTING MEDICATIONS?: NO

## 2024-04-14 SDOH — HEALTH STABILITY: MENTAL HEALTH: WERE YOU ABLE TO COMPLETE ALL THE BEHAVIORAL HEALTH SCREENINGS?: YES

## 2024-04-14 SDOH — ECONOMIC STABILITY: TRANSPORTATION INSECURITY
IN THE PAST 12 MONTHS, HAS LACK OF TRANSPORTATION KEPT YOU FROM MEETINGS, WORK, OR FROM GETTING THINGS NEEDED FOR DAILY LIVING?: NO

## 2024-04-14 SDOH — HEALTH STABILITY: MENTAL HEALTH: SUICIDAL BEHAVIOR (LIFETIME): NO

## 2024-04-14 SDOH — SOCIAL STABILITY: SOCIAL INSECURITY: DOES ANYONE TRY TO KEEP YOU FROM HAVING/CONTACTING OTHER FRIENDS OR DOING THINGS OUTSIDE YOUR HOME?: NO

## 2024-04-14 SDOH — ECONOMIC STABILITY: FOOD INSECURITY: WITHIN THE PAST 12 MONTHS, THE FOOD YOU BOUGHT JUST DIDN'T LAST AND YOU DIDN'T HAVE MONEY TO GET MORE.: NEVER TRUE

## 2024-04-14 SDOH — SOCIAL STABILITY: SOCIAL INSECURITY: VERBAL ABUSE: DENIES

## 2024-04-14 SDOH — SOCIAL STABILITY: SOCIAL INSECURITY: ARE THERE ANY APPARENT SIGNS OF INJURIES/BEHAVIORS THAT COULD BE RELATED TO ABUSE/NEGLECT?: NO

## 2024-04-14 SDOH — SOCIAL STABILITY: SOCIAL INSECURITY: HAS ANYONE EVER THREATENED TO HURT YOUR FAMILY OR YOUR PETS?: NO

## 2024-04-14 SDOH — SOCIAL STABILITY: SOCIAL INSECURITY: DO YOU FEEL ANYONE HAS EXPLOITED OR TAKEN ADVANTAGE OF YOU FINANCIALLY OR OF YOUR PERSONAL PROPERTY?: NO

## 2024-04-14 SDOH — ECONOMIC STABILITY: FOOD INSECURITY: WITHIN THE PAST 12 MONTHS, THE FOOD YOU BOUGHT JUST DIDN’T LAST AND YOU DIDN’T HAVE MONEY TO GET MORE.: NEVER TRUE

## 2024-04-14 SDOH — ECONOMIC STABILITY: TRANSPORTATION INSECURITY
IN THE PAST 12 MONTHS, HAS THE LACK OF TRANSPORTATION KEPT YOU FROM MEDICAL APPOINTMENTS OR FROM GETTING MEDICATIONS?: NO

## 2024-04-14 SDOH — SOCIAL STABILITY: SOCIAL INSECURITY: PHYSICAL ABUSE: DENIES

## 2024-04-14 SDOH — ECONOMIC STABILITY: HOUSING INSECURITY: DO YOU FEEL UNSAFE GOING BACK TO THE PLACE WHERE YOU ARE LIVING?: NO

## 2024-04-14 SDOH — HEALTH STABILITY: MENTAL HEALTH: NON-SPECIFIC ACTIVE SUICIDAL THOUGHTS (PAST 1 MONTH): NO

## 2024-04-14 SDOH — ECONOMIC STABILITY: FOOD INSECURITY: WITHIN THE PAST 12 MONTHS, YOU WORRIED THAT YOUR FOOD WOULD RUN OUT BEFORE YOU GOT THE MONEY TO BUY MORE.: NEVER TRUE

## 2024-04-14 SDOH — SOCIAL STABILITY: SOCIAL INSECURITY: HAVE YOU HAD THOUGHTS OF HARMING ANYONE ELSE?: NO

## 2024-04-14 SDOH — SOCIAL STABILITY: SOCIAL INSECURITY: ABUSE SCREEN: ADULT

## 2024-04-14 SDOH — ECONOMIC STABILITY: TRANSPORTATION INSECURITY

## 2024-04-14 ASSESSMENT — PATIENT HEALTH QUESTIONNAIRE - PHQ9
1. LITTLE INTEREST OR PLEASURE IN DOING THINGS: NOT AT ALL
SUM OF ALL RESPONSES TO PHQ9 QUESTIONS 1 & 2: 0
2. FEELING DOWN, DEPRESSED OR HOPELESS: NOT AT ALL

## 2024-04-14 ASSESSMENT — ACTIVITIES OF DAILY LIVING (ADL)
FEEDING: INDEPENDENT
WALKS_IN_HOME: INDEPENDENTLY
BATHING: INDEPENDENT
LACK_OF_TRANSPORTATION: NO
HOW_WELL_CAN_YOU_BATHE_YOURSELF: INDEPENDENTLY
HEARING_LEFT_EAR: NO PROBLEMS
ADL_BEFORE_ADMISSION: RIGHT
HOW_WELL_CAN_YOU_USE_BATHROOM_BY_YOURSELF: INDEPENDENTLY
HEARING_RIGHT_EAR: NO PROBLEMS
HOW_WELL_CAN_YOU_COMPLETE_GROOMING_TASKS: INDEPENDENTLY
HOW_WELL_CAN_YOU_DRESS_YOURSELF: INDEPENDENTLY
DRESSING: INDEPENDENT
HOW_WELL_CAN_YOU_FEED_YOURSELF: INDEPENDENTLY
LACK_OF_TRANSPORTATION: NO
ADL_BEFORE_ADMISSION: INDEPENDENTLY
TOILETING: INDEPENDENT
ADEQUATE_TO_COMPLETE_ADL: YES
ADEQUATE_TO_COMPLETE_ADL: YES

## 2024-04-14 ASSESSMENT — PAIN SCALES - GENERAL
PAIN_LEVEL: 0
PAINLEVEL_OUTOF10: 5 - MODERATE PAIN
PAINLEVEL_OUTOF10: 4
PAINLEVEL_OUTOF10: 5 - MODERATE PAIN
PAINLEVEL_OUTOF10: 4

## 2024-04-14 ASSESSMENT — LIFESTYLE VARIABLES
HOW MANY STANDARD DRINKS CONTAINING ALCOHOL DO YOU HAVE ON A TYPICAL DAY: PATIENT DOES NOT DRINK
SKIP TO QUESTIONS 9-10: 1
AUDIT-C TOTAL SCORE: 0
HOW OFTEN DO YOU HAVE 6 OR MORE DRINKS ON ONE OCCASION: NEVER
AUDIT-C TOTAL SCORE: 0
HOW OFTEN DO YOU HAVE A DRINK CONTAINING ALCOHOL: NEVER

## 2024-04-14 ASSESSMENT — PAIN - FUNCTIONAL ASSESSMENT: PAIN_FUNCTIONAL_ASSESSMENT: 0-10

## 2024-04-14 ASSESSMENT — PAIN DESCRIPTION - DESCRIPTORS: DESCRIPTORS: ACHING;CRAMPING;SORE

## 2024-04-14 NOTE — ANESTHESIA PROCEDURE NOTES
CSE Block    Start time: 4/14/2024 9:18 AM  End time: 4/14/2024 9:25 AM  Reason for block: primary anesthetic}  Staffing  Performed: resident   Authorized by: Toby Collins DO    Performed by: Toby Collins DO    Preanesthetic Checklist  Completed: patient identified, IV checked, risks and benefits discussed, surgical consent, monitors and equipment checked, pre-op evaluation, timeout performed and sterile techniques followed  Block Timeout  RN/Licensed healthcare professional reads aloud to the Anesthesia provider and entire team: Patient identity, procedure with side and site, patient position, and as applicable the availability of implants/special equipment/special requirements.  Patient on coagulant treatment: no  Timeout performed at: 4/14/2024 9:17 AM    CSE Block  Patient position: sitting  Prep: ChloraPrep  Sterility prep: drape, gloves, gown, hand, mask and cap  Sedation level: no sedation  Patient monitoring: blood pressure, continuous pulse oximetry and heart rate  Approach: midline  Local numbing: lidocaine 1% to skin and subcutaneous tissues  Vertebral space: lumbar  L3-4  Guidance: landmark technique    Epidural Needle  IDRIS technique: saline  Needle type: Tuohy   Needle gauge: 17 G  Needle length: 9 cm  Needle insertion depth: 8 cm  Spinal Needle  Needle type: pencil-point   Needle gauge: 25 G  Needle length: 12.7 cm  Free flow CSF: yes  Epidural Catheter  Catheter type: multi-orifice  Catheter at skin depth: 13 cm  Catheter securement method: clear occlusive dressing              Assessment  Sensory level: T4 bilateral  Block outcome: Allis test negative  Number of attempts: 1  Events: wet tap/possible wet tap  Procedure assessment: patient was uncooperative during the procedure  Additional Notes  Patient positioned sitting on side of bed. Prepped and draped in sterile manner using 2% chlorohexadine. Skin anesthetized with 1% lidocaine at L3-4. Tuohy needle advanced until contact  w/interspinous ligament. Patient noted midline pain with any movement of Tuohy needle (even when no motion/pressure applied to needle) and was moving around during advancement. Epidural space achieved via loss w/saline. Patient was coached to try and remain still. Spinal needle advanced via Tuohy. Slight passive fill of CSF noted, however unable to aspirate. Glass syringe reconnected to confirm epidural space w/air due to patient movement (test noted to be ambiguous). Advanced Tuohy needle slightly and popping sensation felt followed by fluid in glass syringe. Syringe disconnected and slow drip of CSF noted out of Tuogy syringe. Spinal needle placed and 2mL of 0.75mg bupivacaine injected. Catheter threaded and secured in place w/Opsites. Patient positioned and T4 level achieved BL. No other complications noted. Catheter labeled as spinal catheter, plan to remove before leaving OR.

## 2024-04-14 NOTE — ANESTHESIA PREPROCEDURE EVALUATION
Patient: Amaya Todd    Evaluation Method: In-person visit    Procedure Information       Date/Time: 24 0900    Procedures:       OBGYN Delivery  Section - R C/S  x 4 @ 35 wks and PPTL - vasa previa      Ligation Fallopian Tube (Bilateral)    Location: [facility] / [facility]    Surgeons: Brien Melendrez MD            Relevant Problems   Anesthesia (within normal limits)  History of one sided epidural during last C section in . Reports significant anxiety regarding CSE placement today. We discussed that she will be the one to decide when she is ready for incision. We also discussed the plan for CSE, epidural as backup, and general anesthesia as a second backup.       Cardiac (within normal limits)      Pulmonary (within normal limits)      Neuro   (+) Anxiety   (+) Generalized anxiety disorder      GI   (+) GERD (gastroesophageal reflux disease)      /Renal   (+) Renal calculi      Liver (within normal limits)      Endocrine (within normal limits)      Hematology   (+) Anemia      Musculoskeletal (within normal limits)      HEENT (within normal limits)      ID (within normal limits)      Skin (within normal limits)      GYN   (+) 26 weeks gestation of pregnancy ([facility])   (+) 29 weeks gestation of pregnancy ([facility])   (+) 30 weeks gestation of pregnancy ([facility])   (+) Multigravida in second trimester ([facility])       Clinical information reviewed:   Tobacco  Allergies  Meds   Med Hx  Surg Hx   Fam Hx          NPO Detail:  No data recorded     OB/GYN     Physical Exam    Airway  Mallampati: I  TM distance: >3 FB  Neck ROM: full     Cardiovascular   Rhythm: regular  Rate: normal     Dental - normal exam     Pulmonary - normal exam     Abdominal            Anesthesia Plan    History of general anesthesia?: yes  History of complications of general anesthesia?: no    ASA 3     CSE     The patient is not a current smoker.    Anesthetic plan and risks discussed with patient.  Use of  blood products discussed with patient who.    Plan discussed with resident.

## 2024-04-14 NOTE — CARE PLAN
VSS t/o shift. Incision dressing, bleeding, and fundus WDL. Lucas in, pt passing flatus, pumping, and visits NICU. Pain managed with tylenol and toradol.       Problem: Postpartum  Goal: Experiences normal postpartum course  Outcome: Progressing  Goal: Appropriate maternal -  bonding  Outcome: Progressing  Goal: Establish and maintain infant feeding pattern for adequate nutrition  Outcome: Progressing  Goal: Incisions, wounds, or drain sites healing without S/S of infection  Outcome: Progressing  Goal: No s/sx infection  Outcome: Progressing  Goal: No s/sx of hemorrhage  Outcome: Progressing  Goal: Minimal s/sx of HDP and BP<160/110  Outcome: Progressing     Problem: Postpartum hemorrhage  Goal: Hemodynamic stability and limit blood loss  Outcome: Progressing     Problem: Pain - Adult  Goal: Verbalizes/displays adequate comfort level or baseline comfort level  Outcome: Progressing     Problem: Safety - Adult  Goal: Free from fall injury  Outcome: Progressing

## 2024-04-14 NOTE — H&P
Obstetrical Admission History and Physical     Amaya Todd is a 33 y.o.  at 35w0d. VINCENZO: 2024, by Last Menstrual Period. Estimated fetal weight: 3100g. She has had prenatal care with [facility] .    Chief Complaint: rCS    Assessment/Plan      33 y.o.  at 35w0d p/f rCS and tubal sterilization in s/o vasa previa    RCS  - T&S, CBC on admission. T&C for 2U given hx of 3 prior C/S.  - We reviewed risks of accreta spectrum disorder  - Will plan for a repeat c/s. The risks and benefits of a RCS were discussed including bleeding, infection and damage to surrounding structures like the bowels bladder, fallopian tubes, ovaries, nerves, blood vessels, and baby. We also discussed possible need for hysterectomy as a life saving measure. Pt was understanding of these risks and desired to proceed.  - CSE or per anesthesiology     Anxiety  - Following with counselor  - Cont lexapro 5 mg qd    BCM: tubal sterilization    Discussed with Dr. Aneesh Golden MD, PGY-3      Principal Problem:    Vasa previa ([facility])  Active Problems:    Vasa previa affecting labor and delivery in domingo pregnancy ([facility])      Pregnancy Problems (from 23 to present)       Problem Noted Resolved    Vasa previa affecting labor and delivery in dom[place] pregnancy ([facility]) 2024 by Laureen Rodriguez MD No    Priority:  Medium      30 weeks gestation of pregnancy ([facility]) 3/15/2024 by Brien Melendrez MD No    Priority:  Medium      29 weeks gestation of pregnancy ([facility]) 3/4/2024 by Brien Melendrez MD No    Priority:  Medium      Indication for care in labor and delivery, antepartum ([facility]) 3/1/2024 by UMESH ColbertCNP No    Priority:  Medium       uterine contractions, antepartum, third trimester ([facility]) 3/1/2024 by CLARI Colbert-CNP No    Priority:  Medium      Vasa previa ([facility]) 1/15/2024 by Meir Deutsch MD No    Priority:  Medium      26 weeks gestation of pregnancy  ([facility]) 10/23/2023 by Meir Deutshc MD No    Priority:  Medium      Overview Addendum 2024  3:11 PM by Meir Deutsch MD     -- History of  x3: We will repeat, patient is anxious about fourth   --History of MACROSOMIA 10 pounds 5 ounces: HgbA1c 5.5  --History of anxiety patient desires to observe   -- History of  kidney stones  --Family history of Down syndrome and muscular dystrophy: Patient declines cfDNA, cystic fibrosis, and SMA  --ULTRASOUND: Dating ultrasound on 10/6 at 7.1 weeks, normal NT on ; US  at 19 wks; US  with SUA, and vasa previa  -- VASA PREVIA: Noted on 20-week ultrasound on .  Plan delivery at 34 to 37 weeks a formal consult with MFM and repeat ultrasound scheduled for          Multigravida in second trimester ([facility]) 10/13/2023 by Raad Burgos MA No    Priority:  Medium            Options for delivery have been discussed with the patient and she elects for a repeat  section. The surgery has been discussed in detail. The risks, benefits, complications, alternatives, expected outcomes, potential problems during recuperation and recovery, and the risks of not performing the procedure were discussed with the patient. The patient stated understanding that the risks of a  section include, but are not limited to: death; reaction to medications; injury to bowel, bladder, ureters, uterus, cervix, vagina, and other pelvic and abdominal structures, infection; blood loss and possible need for transfusion; and potential need for more surgery, including hysterectomy. The risks of injury to the infant during  Section were also discussed. The possible need for a  Section in the future was explained. All questions were answered. There was concurrence with the planned procedure, and the patient wanted to proceed.    Admit to inpatient status. I anticipate that this patient will require a stay exceeding at least 2  midnights for delivery and postpartum.  Proceed with the planned repeat  section.  Management of pregnancy complications, as indicated.    Subjective   Good fetal movement. Denies vaginal bleeding.   Her previous pregnancy(ies) have been delivered by  section(s) and after considering options for delivery of this pregnancy, she has elected for a repeat  section, and this is scheduled for 2024.        Obstetrical History   OB History    Para Term  AB Living   5 3 3 0 1 3   SAB IAB Ectopic Multiple Live Births   1 0 0 0 3      # Outcome Date GA Lbr Eulogio/2nd Weight Sex Delivery Anes PTL Lv   5 Current            4 Term  39w0d  3.997 kg M CS-LTranv   NELY   3 Term  39w0d  4.054 kg M CS-LTranv   NELY      Complications: Failure to Progress in First Stage   2 SAB  8w0d             Birth Comments: D&C   1 Term  39w0d  4.678 kg M CS-LTranv   NELY       Past Medical History  Past Medical History:   Diagnosis Date    Anxiety     Kidney stones         Past Surgical History   Past Surgical History:   Procedure Laterality Date     SECTION, LOW TRANSVERSE  10/28/2022     section    DILATION AND CURETTAGE OF UTERUS  10/31/2019    Dilation and curettage    WISDOM TOOTH EXTRACTION  2020    [place] tooth extraction       Social History  Social History     Tobacco Use    Smoking status: Never    Smokeless tobacco: Never   Substance Use Topics    Alcohol use: Not Currently     Substance and Sexual Activity   Drug Use Never       Allergies  Patient has no known allergies.     Medications  Medications Prior to Admission   Medication Sig Dispense Refill Last Dose    calcium carbonate (Tums) 200 mg calcium chewable tablet Chew.       escitalopram (Lexapro) 5 mg tablet Take 1 tablet (5 mg) by mouth once daily. 30 tablet 2     hydrOXYzine HCL (Atarax) 10 mg tablet Take 1 tablet (10 mg) by mouth every 6 hours if needed for anxiety for up to 10 days. 30 tablet 0      "polyethylene glycol (Glycolax, Miralax) 17 gram packet Take 17 g by mouth once daily.       prenatal vit no.124-iron-folic (Prenatal Vitamin) 27 mg iron- 800 mcg tablet Take by mouth.          Objective    Last Vitals  Temp Pulse Resp BP MAP O2 Sat                   Physical Examination  Physical exam:  General:  AAOx3, No acute distress  Cardiovascular: Warm and well perfused  Respiratory: Normal respiratory effort   Abdominal:  Soft, gravid, non-tender  Skin: No rashes or lesions visualized     NST:    Lab Review  No results found for: \"ABO\", \"LABRH\", \"ABSCRN\"  No results found for: \"WBC\", \"HGB\", \"HCT\", \"PLT\"  No results found for: \"GLUCOSE\", \"NA\", \"K\", \"CL\", \"CO2\", \"ANIONGAP\", \"BUN\", \"CREATININE\", \"EGFR\", \"CALCIUM\", \"ALBUMIN\", \"PROT\", \"ALKPHOS\", \"ALT\", \"AST\", \"BILITOT\"    "

## 2024-04-14 NOTE — CARE PLAN
The clinical goals for the shift include healthy mom/ healthy baby.    Patient admitted for c/s this shift. Baby was delivered via c/s, 1 unit PRBC was administered in OR.  Labs were bety post op/post transfusion and the patient was transferred to [facility] per order and patient was stable at time of transfer.    Breanna Burkett RN

## 2024-04-14 NOTE — L&D DELIVERY NOTE
OB Delivery Note  2024  Amaya Todd  33 y.o.   , Low Transverse        Gestational Age: 35w0d  /Para:   Quantitative Blood Loss: Admission to Discharge: 2351 mL (2024  6:39 AM - 2024  4:57 PM)    Donaldo Todd [[id number]]      Labor Events    Sac identifier: Sac 1  Rupture date/time: 2024 1035  Rupture type: Artificial  Fluid color: Clear  Fluid odor: None  Labor type:  Without Labor  Labor allowed to proceed with plans for an attempted vaginal birth?: No  Complications: Hemorrhage       Labor Length    3rd stage: 0h 01m       Placenta    Placenta delivery date/time: 2024 1040  Placenta removal: Manual removal  Placenta appearance: Intact  Placenta disposition: pathology       Cord    Vessels: 3 vessels  Complications: None  Delayed cord clamping?: No  Gases sent?: Yes  Cord comments: cord blood sent with baby to NICU       Lacerations    Episiotomy: None       Anesthesia    Method: Spinal       Operative Delivery    Forceps attempted?: No  Vacuum extractor attempted?: No       Shoulder Dystocia    Shoulder dystocia present?: No        Delivery    Birth date/time: 2024 10:39:00  Delivery type: , Low Transverse   categorization: repeat   priority: routine  Indications for : Repeat   Complications: Hemorrhage       Resuscitation    Method: Suctioning, Tactile stimulation, Positive pressure ventilation (PPV), Continuous positive airway pressure (CPAP)       Apgars    Living status: Living  Apgar Component Scores:  1 min.:  5 min.:  10 min.:  15 min.:  20 min.:    Skin color:  0  1  1      Heart rate:  1  2  2      Reflex irritability:  1  2  2      Muscle tone:  1  2  2      Respiratory effort:  1  2  2      Total:  4  9  9      Apgars assigned by: CATRACHO       Delivery Providers    Delivering clinician: Brien Melendrez MD   Provider Role    Breanna Burkett RN Delivery Nurse    Chapis Cullen  RN Nursery Nurse    Laureen Rodriguez MD Resident    Eliza Golden MD Resident               Findings:   Dense adhesive disease along fascia, thick and scarred peritoneum, bladder adhered to lower uterine segment, omental adhesion to lower uterine segment     Procedure: Pt was taken to the OR where combined spinal epidural anesthesia was administered.  She was then placed in the dorsal supine position with a left lateral tilt. A doe catheter was placed, SCD’s were applied, and a vaginal prep was performed. A pre-procedure time out was performed.  The pt was given prophylactic dose of IV antibiotics.  She was then prepped and draped in the usual sterile fashion. A Pfannenstiel skin incision was made with the scalpel through the skin and subcutaneous fat to the underlying fascial layer.      The fascia was incised in the midline with the scalpel and the incision was extended bilaterally. There was dense adhesive tissue between the rectus and underlying fascia. The superior aspect of the incision was grasped, tented up with Kocher clamps and the rectus muscle was dissected off. The muscles were divided in the midline.    Due to dense adhesive disease, extensive lysis of adhesions was performed. The peritoneum was scarred and thick, the bladder was backfilled with 300 ml of sterile milk to delineate the boundaries of the bladder. The bladder was tacked up on the lower uterine segment, sterile milk was drained. The peritoneum was entered sharply. There was noted to be a thick omental adhesion to the lower uterine segment, which was suture ligated with 0 Vicryl and in 2 pedicles. Good hemostasis was noted from omental pedicles.       A mid transverse uterine incision was made with the scalpel, the uterine cavity was entered, and the hysterotomy was extended cephalocaudally by stretching.  A vacuum was applied x1, with 1 pop off.  Following pop off the hysterotomy was extended with blunt traction and the infant was  subsequently delivered atraumatically, the cord was clamped and cut and infant was handed off to awaiting nursing.  A cord segment and blood sample were collected.  The placenta was then expressed.  The uterus was exteriorized and cleared of all clot and debris. The uterine incision was repaired using a running locked stitch of 0-Monocryl. A second layer of the same suture was placed in an imbricating fashion. Good hemostasis was noted. At this time, EBL was noted to be >1500 and antibiotics re-dosed. Blood loss was primarily due to brisk bleeding from the hysterotomy.     The hysterotomy was again evaluated and found to be hemostatic, the underside of the fascia and bladder and the rectus muscles were also found to be hemostatic.  The fascia was closed using a running stitch of 0-PDS.  The subcutaneous layer was irrigated, small bleeders were cauterized, and the subcutaneous layer was reapproximated using a running stitch of 2-0 Monocryl. The skin was closed with 4-0 Vicryl on a curved needle.  All counts were correct, the patient tolerated the procedure well.  Dr. Melendrez was present for the entire procedure.  The patient and infant were taken back to the recovery room in stable condition.    Brien Melendrez MD

## 2024-04-14 NOTE — SIGNIFICANT EVENT
Patient informed on arrival to L&D room 12 after OR for section that during CSE, wet tap likely happened. She was informed to watch for any headaches within the next few days and to alert nursing/OB staff early for interventions. Patients SO was also informed intraoperatively as well of likely wet tap (didn't tell patient intraoperatively 2/2 extreme anxiety at time). Patient and SO understand situation and all questions answered at bedside

## 2024-04-14 NOTE — ANESTHESIA POSTPROCEDURE EVALUATION
Patient: Amaya Todd    Procedure Summary       Date: 24 Room / Location: [facility] OB 04 / Virtual [facility] 2 OB    Anesthesia Start: 913 Anesthesia Stop:     Procedures:       OBGYN Delivery  Section (Abdomen)      Ligation Fallopian Tube Diagnosis:       Vasa previa, fetus 1 of multiple gestation ([facility])      (Vasa previa, fetus 1 of multiple gestation [O69.4XX1])    Surgeons: Brien Melendrez MD Responsible Provider: Bertrand Vanessa MD    Anesthesia Type: CSE ASA Status: 3            Anesthesia Type: CSE    Vitals Value Taken Time   /69 24 1137   Temp 37 24 1137   Pulse 78 24 1137   Resp 14 24 1137   SpO2 99 24 1137       Anesthesia Post Evaluation    Patient location during evaluation: bedside  Patient participation: complete - patient participated  Level of consciousness: anxious and awake  Pain score: 0  Pain management: adequate  Airway patency: patent  Cardiovascular status: acceptable  Respiratory status: acceptable  Hydration status: acceptable  Postoperative Nausea and Vomiting: none        No notable events documented.

## 2024-04-14 NOTE — POST-PROCEDURE NOTE
While in OR, patients intrathecal catheter was removed on OR table. Wound was wiped with alcohol, and dressed with tegaderm. No drainage/bleeding noted.

## 2024-04-15 LAB
BLOOD EXPIRATION DATE: NORMAL
BLOOD EXPIRATION DATE: NORMAL
DISPENSE STATUS: NORMAL
DISPENSE STATUS: NORMAL
ERYTHROCYTE [DISTWIDTH] IN BLOOD BY AUTOMATED COUNT: 15.7 % (ref 11.5–14.5)
HCT VFR BLD AUTO: 27.5 % (ref 36–46)
HGB BLD-MCNC: 8.8 G/DL (ref 12–16)
MCH RBC QN AUTO: 30.9 PG (ref 26–34)
MCHC RBC AUTO-ENTMCNC: 32 G/DL (ref 32–36)
MCV RBC AUTO: 97 FL (ref 80–100)
NRBC BLD-RTO: 0 /100 WBCS (ref 0–0)
PLATELET # BLD AUTO: 168 X10*3/UL (ref 150–450)
PRODUCT BLOOD TYPE: 6200
PRODUCT BLOOD TYPE: 6200
PRODUCT CODE: NORMAL
PRODUCT CODE: NORMAL
RBC # BLD AUTO: 2.85 X10*6/UL (ref 4–5.2)
UNIT ABO: NORMAL
UNIT ABO: NORMAL
UNIT NUMBER: NORMAL
UNIT NUMBER: NORMAL
UNIT RH: NORMAL
UNIT RH: NORMAL
UNIT VOLUME: 277
UNIT VOLUME: 350
WBC # BLD AUTO: 8.2 X10*3/UL (ref 4.4–11.3)
XM INTEP: NORMAL
XM INTEP: NORMAL

## 2024-04-15 PROCEDURE — 85027 COMPLETE CBC AUTOMATED: CPT | Performed by: STUDENT IN AN ORGANIZED HEALTH CARE EDUCATION/TRAINING PROGRAM

## 2024-04-15 PROCEDURE — 2500000004 HC RX 250 GENERAL PHARMACY W/ HCPCS (ALT 636 FOR OP/ED): Performed by: STUDENT IN AN ORGANIZED HEALTH CARE EDUCATION/TRAINING PROGRAM

## 2024-04-15 PROCEDURE — 36415 COLL VENOUS BLD VENIPUNCTURE: CPT | Performed by: STUDENT IN AN ORGANIZED HEALTH CARE EDUCATION/TRAINING PROGRAM

## 2024-04-15 PROCEDURE — 1210000001 HC SEMI-PRIVATE ROOM DAILY

## 2024-04-15 PROCEDURE — 2500000001 HC RX 250 WO HCPCS SELF ADMINISTERED DRUGS (ALT 637 FOR MEDICARE OP): Performed by: FAMILY MEDICINE

## 2024-04-15 PROCEDURE — 2500000001 HC RX 250 WO HCPCS SELF ADMINISTERED DRUGS (ALT 637 FOR MEDICARE OP): Performed by: STUDENT IN AN ORGANIZED HEALTH CARE EDUCATION/TRAINING PROGRAM

## 2024-04-15 RX ORDER — CYCLOBENZAPRINE HCL 10 MG
10 TABLET ORAL 3 TIMES DAILY PRN
Status: COMPLETED | OUTPATIENT
Start: 2024-04-15 | End: 2024-04-16

## 2024-04-15 RX ADMIN — ACETAMINOPHEN 975 MG: 325 TABLET ORAL at 11:38

## 2024-04-15 RX ADMIN — ACETAMINOPHEN 975 MG: 325 TABLET ORAL at 23:51

## 2024-04-15 RX ADMIN — IBUPROFEN 600 MG: 600 TABLET, FILM COATED ORAL at 17:53

## 2024-04-15 RX ADMIN — IBUPROFEN 600 MG: 600 TABLET, FILM COATED ORAL at 23:51

## 2024-04-15 RX ADMIN — ACETAMINOPHEN 975 MG: 325 TABLET ORAL at 17:53

## 2024-04-15 RX ADMIN — CYCLOBENZAPRINE 10 MG: 10 TABLET, FILM COATED ORAL at 15:07

## 2024-04-15 RX ADMIN — ENOXAPARIN SODIUM 40 MG: 100 INJECTION SUBCUTANEOUS at 08:27

## 2024-04-15 RX ADMIN — DIPHENHYDRAMINE HYDROCHLORIDE 25 MG: 25 CAPSULE ORAL at 00:02

## 2024-04-15 RX ADMIN — IBUPROFEN 600 MG: 600 TABLET, FILM COATED ORAL at 11:38

## 2024-04-15 RX ADMIN — KETOROLAC TROMETHAMINE 30 MG: 30 INJECTION INTRAMUSCULAR; INTRAVENOUS at 05:41

## 2024-04-15 RX ADMIN — ESCITALOPRAM 5 MG: 5 TABLET, FILM COATED ORAL at 08:28

## 2024-04-15 RX ADMIN — CYCLOBENZAPRINE 10 MG: 10 TABLET, FILM COATED ORAL at 23:51

## 2024-04-15 RX ADMIN — ACETAMINOPHEN 975 MG: 325 TABLET ORAL at 05:40

## 2024-04-15 ASSESSMENT — PAIN SCALES - GENERAL
PAINLEVEL_OUTOF10: 4
PAINLEVEL_OUTOF10: 5 - MODERATE PAIN
PAINLEVEL_OUTOF10: 2
PAINLEVEL_OUTOF10: 4
PAINLEVEL_OUTOF10: 6

## 2024-04-15 ASSESSMENT — PAIN DESCRIPTION - DESCRIPTORS
DESCRIPTORS: ACHING;SORE;DISCOMFORT
DESCRIPTORS: ACHING;SORE;DISCOMFORT
DESCRIPTORS: HEADACHE;DULL

## 2024-04-15 NOTE — LACTATION NOTE
Lactation Consultant Note  Lactation Consultation  Reason for Consult: Initial assessment, NICU baby, Late  infant  Consultant Name: Kathrin Rodriguez RN, IBCLC    Maternal Information  Has mother  before?: Yes  How long did the mother previously breastfeed?: breast fed all of her 4 children - the last one up until a few months ago.  Infant to breast within first 2 hours of birth?: No  Breastfeeding Delayed Due to: Infant status  Exclusive Pump and Bottle Feed: No    Maternal Assessment  Breast Assessment: Large, Symmetrical, Compressible, Other (Comment) (expressible)  Nipple Assessment: Intact, Red/inflamed, Sore, Erect  Areola Assessment: Normal    Infant Assessment       Feeding Assessment  Unable to assess infant feeding at this time: Other (Comment) (baby remains in the NICU)    LATCH TOOL       Breast Pump  Pump: Hospital grade electric pump, Double breast pumping, Massage  Frequency: Other (comment) (encouraged her to pump every 3 hours)  Breast Shield Size and Type: 21 mm, Other (comment) (mom mesures 18 MM diameter on both nipples= needs 19MM/ 20 MM/ or 21 MM breast flanges)  Units of Volume: mL per session    Other OB Lactation Tools  Lactation Tools: Flanges, Lanolin    Patient Follow-up  Outpatient Lactation Follow-up: Recommended    Other OB Lactation Documentation  Maternal Risk Factors: Extreme tiredness, fatigue or stress,  delivery, Significant hemorrhage  Infant Risk Factors: Prematurity <37 weeks    Recommendations/Summary  Baby remains in the NICU at this time.   Mom reported to having breast fed her other children with the last child up until a few months ago.   Her plan is to eventually latch the baby directly to the breast but, will pump now as needed.     Oriented mom to pump set up- use- and cleaning of pump parts.     Mom's nipples measures to be 18 MM diameter - advised she should be using the 19 MM/ 20 MM/ or 21 MM breast flanges.     Reviewed the difference  between latching and pumping, the benefit of skin to skin, the benefits of breast massage prior to pumping, expectations of volume with pumping, milk storage and cleaning of pump parts.   PI-123 and [facility] pump cleaning guide given.     Encouraged her to pump every 3 hours (8-12 times in a 24 hour period) for 20 minutes on both breasts and to take any pumped breast milk to the NICU for the baby or have RN place in refrigerator.     Mom denies any questions at this time.     Mom needs a breast pump for at home. Will fax insurance information to [place].     Encouraged her to utilize the outpatient lactation resources, if needed.   Contact information given.   [phone] [place] or [phone] [place]

## 2024-04-15 NOTE — PROGRESS NOTES
"Postpartum Progress Note      Assessment/Plan       Amaya Todd is a 33 y.o., , who initially presented for rCS in s/o vasa previa. She had a , Low Transverse  delivery on 2024  at 35w0d and is now POD1.      Post-op , Low Transverse   - continue routine postoperative care  - pain well controlled on ERAS protocol  - Hg  11.7 --> 10.5 --> EBL 2351 --> POD#0 9.4 --> POD#1 pending  - asymptomatic, PO Fe on discharge   Results from last 7 days   Lab Units 24  1240 24  0810 24  1136   HEMOGLOBIN g/dL 9.4* 10.5* 11.7*   - Pt's blood type is A POS. Rhogam is not indicated.  - DVT Score: 5 SCDs and enoxaparin prophylactic dose daily while inpatient    Acute blood loss anemia:  - HMG 11.7 --> admission 10.5 --> EBL 2351 --> POD#0 9.4 --> POD #1 8.8 --> POD#2 repeat ordered   - intermittent mild lightheadedness with quick position changes - encouraged to change positions slowly and allow body time to adjust   - may consider IV Fe if HMG continues to drop/symptomatic   - PO Fe on discharge    Elevated BP w/o dx of HDP  - 2 mild range BP <4 hours apart post delivery  - normotensive since  - will continue to monitor, VS q4h    Anxiety  - continue Lexapro 5mg daily  - managing emotions \"okay, I guess\" with infant in NICU  - tearful during exam   - follows with counselor    Contraception  s/p tubal ligation or salpingectomy    Maternal Well-Being  - emotional support provided  - infant in NICU, SW consult placed   -  feeding: breastfeeding/pumping encouraged; lactation consult prn     Dispo  - anticipate d/c on POD #3 if meeting all post-op and postpartum milestones  - The signs and symptoms of PEC were reviewed with the patient, including unrelenting headache, vision changes/blurred vision, and RUQ pain    - On discharge, follow up with primary OB in:       - 2 weeks for incision check       - 4-6 weeks for post-partum visit       Principal Problem:    Vasa previa " ([facility])  Active Problems:    Vasa previa affecting labor and delivery in [place] pregnancy ([facility])    Renal calculi    Anemia        Subjective   Her pain is moderately controlled with current medications  She is passing flatus  She is ambulating independently  She is tolerating a Adult diet Regular  She is voiding spontaneously  She reports no breast or nursing problems, pumping for baby  She reports emotional concerns about baby in NICU and reports no suicidal/homicidal ideation today     Seen at bedside, lactation in room. Pt pumping and hopeful she will be able to trial latching with infant in NICU today.       Objective   Last Vitals:  Temp Pulse Resp BP MAP Pulse Ox   36.5 °C (97.7 °F) 68 16 124/78   96 %       Physical Exam:  General: well appearing, well nourished, postpartum  Obstetric: fundus firm below umbilicus, lochia light  Skin: Warm, dry; no rashes/lesions/erythema  Abdominal: dressing CDI  Breast: No masses, nipple discharge, pumping   Neuro: A/Ox3, no gross motor deficit   GI: no distension, appropriately tender, soft  Respiratory: Even and unlabored on RA  Cardiovascular: Trace BLE edema; No erythema, warmth  Psych: appropriate mood and affect      Lab Data:  Lab Results   Component Value Date    WBC 13.0 (H) 04/14/2024    HGB 9.4 (L) 04/14/2024    HCT 27.9 (L) 04/14/2024     04/14/2024

## 2024-04-15 NOTE — ANESTHESIA POSTPROCEDURE EVALUATION
Patient: Amaya Todd    Procedure Summary       Date: 24 Room / Location: [facility] OB 04 / Virtual MAC 2 OB    Anesthesia Start: 913 Anesthesia Stop:     Procedure: OBGYN Delivery  Section (Abdomen) Diagnosis:       Vasa previa, fetus 1 of multiple gestation ([facility])      (Vasa previa, fetus 1 of multiple gestation [O69.4XX1])    Surgeons: Brien Melendrez MD Responsible Provider: Bertrand Vanessa MD    Anesthesia Type: CSE ASA Status: 3            Anesthesia Type: CSE  Amaya Todd is a 33 y.o., , who had a , Low Transverse  delivery on 2024  at 35w0d and is now POD1.    She had Neuraxial Anesthesia with the following complications noted: Dural puncture with tuohy needle preceding CSE        Pain well controlled    Vitals:    04/15/24 0445   BP: 128/76   Pulse: 71   Resp: 16   Temp: 36.1 °C (97 °F)   SpO2: 98%       Neuraxial site assessed. No visible redness or swelling or drainage. Patient able to ambulate and move all extremities without difficulty. Able to void. No complaints of nausea/vomiting. Tolerating PO intake well. No s/sx of PDPH.       Anesthesia will continue to follow patient     HOMER Oswald CAA

## 2024-04-15 NOTE — CARE PLAN
Problem: Postpartum  Goal: Experiences normal postpartum course  Outcome: Progressing  Goal: Appropriate maternal -  bonding  Outcome: Progressing  Goal: Establish and maintain infant feeding pattern for adequate nutrition  Outcome: Progressing  Goal: Incisions, wounds, or drain sites healing without S/S of infection  Outcome: Progressing  Goal: No s/sx infection  Outcome: Progressing  Goal: No s/sx of hemorrhage  Outcome: Progressing  Goal: Minimal s/sx of HDP and BP<160/110  Outcome: Progressing     Problem: Postpartum hemorrhage  Goal: Hemodynamic stability and limit blood loss  Outcome: Progressing     Problem: Pain - Adult  Goal: Verbalizes/displays adequate comfort level or baseline comfort level  Outcome: Progressing     Problem: Safety - Adult  Goal: Free from fall injury  Outcome: Progressing   The patient's goals for the shift include rest    The clinical goals for the shift include Meet appropriate PP milestones

## 2024-04-15 NOTE — PROGRESS NOTES
Amaya Todd is a 33 y.o., , who had a , Low Transverse  delivery on 2024  at 35w0d in the s/o vasa previa and is now POD1.     She had Neuraxial Anesthesia with the following complications noted: Dural puncture with tuohy needle preceding CSE on .        Subjective     Patient notes some moderate neck pain and stiffness as well as some fullness in her ears (feels as if blood is rushing into them). She denies any headache, tinnitus, vision changes, n/v. The symptoms are exacerbated by leaning forwards and alleviated by laying supine. She denies having experienced symptoms like this before.        Objective     Physical Exam    Physical Exam:  General: well appearing  Skin: Warm, dry; no rashes/lesions/erythema  Neuro: A&Ox3 without any gross neurologic deficit  Respiratory: Breathing comfortably on RA  Psych: appropriate mood and affect    Neuraxial site assessed. No visible redness or swelling or drainage. Patient able to ambulate and move all extremities without difficulty. Able to void. No complaints of nausea/vomiting. Tolerating PO intake well.     Last Recorded Vitals    Vitals:    04/15/24 1139   BP: 115/69   Pulse: 67   Resp: 16   Temp: 36.4 °C (97.5 °F)   SpO2: 98%        Assessment/Plan   Amaya Todd is a 33 y.o., , who had a , Low Transverse  delivery on 2024  at 35w0d in the s/o vasa previa and is now POD1. Patient experienced a known and documented likely wet tap during CSE on 24. Patient had been feeling fine this morning until the afternoon when she started noticing neck pain and ear fullness/discomfort when sitting upright. She denies any headache at this time. Symptoms are consistent with possible atypical/developing postdural puncture headache and are somewhat controlled with conservative measures at this time.    Recommendations:  - Continue oral tylenol, NSAIDs as needed  - Consider IV or oral caffeine to help with symptoms  - Discussed the  option of an epidural blood patch with the patient if conservative measures are inadequate, patient wants to continue with conservative measures at this time.  - Anesthesia will continue to follow and monitor symptoms      Manohar Camacho MD  PGY2, Anesthesiology

## 2024-04-16 ENCOUNTER — ANESTHESIA EVENT (OUTPATIENT)
Dept: OBSTETRICS AND GYNECOLOGY | Facility: HOSPITAL | Age: 34
End: 2024-04-16
Payer: COMMERCIAL

## 2024-04-16 ENCOUNTER — ANESTHESIA (OUTPATIENT)
Dept: OBSTETRICS AND GYNECOLOGY | Facility: HOSPITAL | Age: 34
End: 2024-04-16
Payer: COMMERCIAL

## 2024-04-16 PROBLEM — Z34.82 MULTIGRAVIDA IN SECOND TRIMESTER (HHS-HCC): Status: RESOLVED | Noted: 2023-10-13 | Resolved: 2024-04-16

## 2024-04-16 PROBLEM — Z3A.30 30 WEEKS GESTATION OF PREGNANCY (HHS-HCC): Status: RESOLVED | Noted: 2024-03-15 | Resolved: 2024-04-16

## 2024-04-16 PROBLEM — C96.9 HEMATOLOGIC MALIGNANCY (MULTI): Status: ACTIVE | Noted: 2024-04-16

## 2024-04-16 PROBLEM — M19.90 OSTEOARTHRITIS: Status: ACTIVE | Noted: 2024-04-16

## 2024-04-16 PROBLEM — Z3A.26 26 WEEKS GESTATION OF PREGNANCY (HHS-HCC): Status: RESOLVED | Noted: 2023-10-23 | Resolved: 2024-04-16

## 2024-04-16 PROBLEM — Z3A.29 29 WEEKS GESTATION OF PREGNANCY (HHS-HCC): Status: RESOLVED | Noted: 2024-03-04 | Resolved: 2024-04-16

## 2024-04-16 LAB
ERYTHROCYTE [DISTWIDTH] IN BLOOD BY AUTOMATED COUNT: 15.3 % (ref 11.5–14.5)
HCT VFR BLD AUTO: 27.4 % (ref 36–46)
HGB BLD-MCNC: 9.5 G/DL (ref 12–16)
MCH RBC QN AUTO: 32 PG (ref 26–34)
MCHC RBC AUTO-ENTMCNC: 34.7 G/DL (ref 32–36)
MCV RBC AUTO: 92 FL (ref 80–100)
NRBC BLD-RTO: 0 /100 WBCS (ref 0–0)
PLATELET # BLD AUTO: 185 X10*3/UL (ref 150–450)
RBC # BLD AUTO: 2.97 X10*6/UL (ref 4–5.2)
WBC # BLD AUTO: 7.8 X10*3/UL (ref 4.4–11.3)

## 2024-04-16 PROCEDURE — 2500000001 HC RX 250 WO HCPCS SELF ADMINISTERED DRUGS (ALT 637 FOR MEDICARE OP): Performed by: STUDENT IN AN ORGANIZED HEALTH CARE EDUCATION/TRAINING PROGRAM

## 2024-04-16 PROCEDURE — 2500000001 HC RX 250 WO HCPCS SELF ADMINISTERED DRUGS (ALT 637 FOR MEDICARE OP): Performed by: NURSE PRACTITIONER

## 2024-04-16 PROCEDURE — 2500000004 HC RX 250 GENERAL PHARMACY W/ HCPCS (ALT 636 FOR OP/ED): Performed by: NURSE PRACTITIONER

## 2024-04-16 PROCEDURE — 36415 COLL VENOUS BLD VENIPUNCTURE: CPT | Performed by: FAMILY MEDICINE

## 2024-04-16 PROCEDURE — 99231 SBSQ HOSP IP/OBS SF/LOW 25: CPT | Performed by: NURSE PRACTITIONER

## 2024-04-16 PROCEDURE — 62273 INJECT EPIDURAL PATCH: CPT | Performed by: STUDENT IN AN ORGANIZED HEALTH CARE EDUCATION/TRAINING PROGRAM

## 2024-04-16 PROCEDURE — 2500000005 HC RX 250 GENERAL PHARMACY W/O HCPCS: Performed by: NURSE PRACTITIONER

## 2024-04-16 PROCEDURE — 85027 COMPLETE CBC AUTOMATED: CPT | Performed by: FAMILY MEDICINE

## 2024-04-16 PROCEDURE — 2500000004 HC RX 250 GENERAL PHARMACY W/ HCPCS (ALT 636 FOR OP/ED): Performed by: STUDENT IN AN ORGANIZED HEALTH CARE EDUCATION/TRAINING PROGRAM

## 2024-04-16 PROCEDURE — 1100000001 HC PRIVATE ROOM DAILY

## 2024-04-16 RX ORDER — CYCLOBENZAPRINE HCL 10 MG
5 TABLET ORAL 3 TIMES DAILY PRN
Status: DISCONTINUED | OUTPATIENT
Start: 2024-04-16 | End: 2024-04-18 | Stop reason: HOSPADM

## 2024-04-16 RX ORDER — KETOROLAC TROMETHAMINE 30 MG/ML
30 INJECTION, SOLUTION INTRAMUSCULAR; INTRAVENOUS EVERY 6 HOURS
Status: COMPLETED | OUTPATIENT
Start: 2024-04-16 | End: 2024-04-17

## 2024-04-16 RX ORDER — ACETAMINOPHEN 325 MG/1
650 TABLET ORAL EVERY 6 HOURS
Status: DISCONTINUED | OUTPATIENT
Start: 2024-04-16 | End: 2024-04-18 | Stop reason: HOSPADM

## 2024-04-16 RX ORDER — CYCLOBENZAPRINE HCL 10 MG
5 TABLET ORAL 3 TIMES DAILY
Status: DISCONTINUED | OUTPATIENT
Start: 2024-04-16 | End: 2024-04-16

## 2024-04-16 RX ORDER — LIDOCAINE 560 MG/1
1 PATCH PERCUTANEOUS; TOPICAL; TRANSDERMAL DAILY
Status: DISCONTINUED | OUTPATIENT
Start: 2024-04-16 | End: 2024-04-18 | Stop reason: HOSPADM

## 2024-04-16 RX ORDER — BUTALBITAL, ACETAMINOPHEN AND CAFFEINE 50; 325; 40 MG/1; MG/1; MG/1
1 TABLET ORAL EVERY 4 HOURS PRN
Status: DISCONTINUED | OUTPATIENT
Start: 2024-04-16 | End: 2024-04-18 | Stop reason: HOSPADM

## 2024-04-16 RX ORDER — KETOROLAC TROMETHAMINE 30 MG/ML
30 INJECTION, SOLUTION INTRAMUSCULAR; INTRAVENOUS EVERY 6 HOURS
Status: DISCONTINUED | OUTPATIENT
Start: 2024-04-16 | End: 2024-04-16

## 2024-04-16 RX ORDER — CAFFEINE 200 MG
100 TABLET ORAL EVERY 4 HOURS PRN
Status: DISCONTINUED | OUTPATIENT
Start: 2024-04-16 | End: 2024-04-16

## 2024-04-16 RX ADMIN — ACETAMINOPHEN 975 MG: 325 TABLET ORAL at 05:53

## 2024-04-16 RX ADMIN — KETOROLAC TROMETHAMINE 30 MG: 30 INJECTION, SOLUTION INTRAMUSCULAR at 11:05

## 2024-04-16 RX ADMIN — ENOXAPARIN SODIUM 40 MG: 100 INJECTION SUBCUTANEOUS at 08:28

## 2024-04-16 RX ADMIN — ESCITALOPRAM 5 MG: 5 TABLET, FILM COATED ORAL at 08:27

## 2024-04-16 RX ADMIN — ACETAMINOPHEN 650 MG: 325 TABLET ORAL at 15:27

## 2024-04-16 RX ADMIN — KETOROLAC TROMETHAMINE 30 MG: 30 INJECTION, SOLUTION INTRAMUSCULAR at 17:55

## 2024-04-16 RX ADMIN — CYCLOBENZAPRINE 10 MG: 10 TABLET, FILM COATED ORAL at 15:27

## 2024-04-16 RX ADMIN — IBUPROFEN 600 MG: 600 TABLET, FILM COATED ORAL at 05:53

## 2024-04-16 RX ADMIN — BUTALBITAL, ACETAMINOPHEN, AND CAFFEINE 1 TABLET: 50; 325; 40 TABLET ORAL at 17:56

## 2024-04-16 RX ADMIN — BUTALBITAL, ACETAMINOPHEN, AND CAFFEINE 1 TABLET: 50; 325; 40 TABLET ORAL at 22:25

## 2024-04-16 RX ADMIN — LIDOCAINE 1 PATCH: 4 PATCH TOPICAL at 11:06

## 2024-04-16 RX ADMIN — ACETAMINOPHEN 650 MG: 325 TABLET ORAL at 21:18

## 2024-04-16 RX ADMIN — BUTALBITAL, ACETAMINOPHEN, AND CAFFEINE 1 TABLET: 50; 325; 40 TABLET ORAL at 11:05

## 2024-04-16 SDOH — HEALTH STABILITY: MENTAL HEALTH: CURRENT SMOKER: 0

## 2024-04-16 ASSESSMENT — PAIN DESCRIPTION - DESCRIPTORS
DESCRIPTORS: ACHING;SORE
DESCRIPTORS: ACHING;SORE;DISCOMFORT
DESCRIPTORS: HEADACHE
DESCRIPTORS: ACHING;SORE;DISCOMFORT

## 2024-04-16 ASSESSMENT — PAIN SCALES - GENERAL
PAINLEVEL_OUTOF10: 4
PAINLEVEL_OUTOF10: 3
PAINLEVEL_OUTOF10: 4
PAINLEVEL_OUTOF10: 7
PAINLEVEL_OUTOF10: 7
PAINLEVEL_OUTOF10: 0 - NO PAIN
PAINLEVEL_OUTOF10: 5 - MODERATE PAIN
PAINLEVEL_OUTOF10: 3
PAIN_LEVEL: 10
PAINLEVEL_OUTOF10: 2

## 2024-04-16 ASSESSMENT — PAIN - FUNCTIONAL ASSESSMENT
PAIN_FUNCTIONAL_ASSESSMENT: 0-10
PAIN_FUNCTIONAL_ASSESSMENT: 0-10

## 2024-04-16 NOTE — ANESTHESIA POSTPROCEDURE EVALUATION
Patient: Amaya Todd    Procedure Summary       Date: 24 Room / Location: [facility] OB 04 / Virtual MAC 2 OB    Anesthesia Start: 913 Anesthesia Stop:     Procedure: OBGYN Delivery  Section (Abdomen) Diagnosis:       Vasa previa, fetus 1 of multiple gestation ([facility])      (Vasa previa, fetus 1 of multiple gestation [O69.4XX1])    Surgeons: Brien Melendrez MD Responsible Provider: Bertrand Vanessa MD    Anesthesia Type: CSE ASA Status: 3            Anesthesia Type: CSE  Anesthesia Post Evaluation    Patient location during evaluation: floor  Patient participation: complete - patient participated  Level of consciousness: awake and alert  Pain score: 10  Pain management: adequate  Multimodal analgesia pain management approach  Airway patency: patent  Two or more strategies used to mitigate risk of obstructive sleep apnea  Cardiovascular status: stable and acceptable  Respiratory status: acceptable and room air  Hydration status: euvolemic  Postoperative Nausea and Vomiting: none  Comments: Amaya Todd is a 33 y.o., , who had a , Low Transverse delivery on 2024 at 35w0d and is now POD2.    She had Neuraxial Anesthesia with the following complications noted: wet tap       Patient is currently having ongoing neck and shoulder pain that is radiating to occiput, bilateral ear fullness, and dizziness. She reports her symptoms are worse when upright. Symptoms not well controlled with current conservative management.    CRISTAL KHAN  BLE Motor strength intact    ---------------------------               24                      0415         ---------------------------   BP:          136/81         Pulse:         72           Resp:          18           Temp:   36.6 °C (97.9 °F)   SpO2:          97%         ---------------------------    Assessment and Plan:  Wet tap with Post-dural Puncture Headache  - Current conservative measures not adequately  controlling patients symptoms  - Discussed with patient epidural blood patch, she would like to proceed with one today    Anesthesia will continue to follow patient     Erica Martinez MD           No notable events documented.

## 2024-04-16 NOTE — ANESTHESIA PREPROCEDURE EVALUATION
Patient: Amaya Todd    Procedure Information    Date: 04/16/24  Procedure: Epidural Blood Patch         Relevant Problems   Anesthesia (within normal limits)      Cardiac (within normal limits)      Pulmonary (within normal limits)      Neuro   (+) Anxiety   (+) Generalized anxiety disorder      GI   (+) GERD (gastroesophageal reflux disease)      /Renal   (+) Renal calculi      Liver (within normal limits)      Hematology   (+) Anemia      Musculoskeletal (within normal limits)      GYN   (+) 26 weeks gestation of pregnancy ([facility]) (Resolved)   (+) 29 weeks gestation of pregnancy ([facility]) (Resolved)   (+) 30 weeks gestation of pregnancy ([facility]) (Resolved)   (+) Multigravida in second trimester ([facility]) (Resolved)       Clinical information reviewed:   Tobacco  Allergies  Meds   Med Hx  Surg Hx   Fam Hx          NPO Detail:  No data recorded     Physical Exam    Airway  Mallampati: II  TM distance: >3 FB  Neck ROM: full     Cardiovascular - normal exam     Dental - normal exam     Pulmonary - normal exam     Abdominal            Anesthesia Plan    History of general anesthesia?: yes  History of complications of general anesthesia?: no    ASA 2     epidural   (Epidural blood patch - risks, benefits, alternatives discussed with patient including risk of back soreness, risk of failure, risk of repeated wet tap, risk of bleeding, infection, injury to surrounding structures. Patient understands and agrees to plan.)  The patient is not a current smoker.    Anesthetic plan and risks discussed with patient.  Use of blood products discussed with patient who consented to blood products.    Plan discussed with resident and attending.

## 2024-04-16 NOTE — PROGRESS NOTES
Postpartum Progress Note    Assessment/Plan     Amaya Todd is a 33 y.o., , who initially presented for rCS in s/o vasa previa. She had a , Low Transverse  delivery on 2024  at 35w0d and is now POD2.    #PDPH  - anesthesia monitoring  - lovenox @ 0890, now on hold for possible blood patch later this evening  - fioricet prn, encouraged use  - tylenol decreased to 650 q6  - toradol x4 doses ordered in place of ibuprofen    Acute blood loss anemia:  - Hg 11.7 --> admission 10.5 --> EBL 2351 +1u PRBC in OR --> POD#0 9.4 --> POD #1 8.8 -->9.5  - PO Fe on discharge    #gHTN  - diagnosed by 2 mild range BPs > 4 hours apart (though while pt in pain/anxious)  - asymptomatic  - BP low mild range on no meds; monitor trend closely  - HELLP labs negative and P:C Not ordered or collected  - will continue to monitor, discussed 3 day stay for gHTN and pt states understanding  - Reviewed patient's risk factors: Grand multip  - BP cuff for home monitoring BID: Given cuff    Anxiety  - continue Lexapro 5mg daily  - NICU SW consult    Post-op , Low Transverse   - continue routine postoperative care  - pain well controlled on ERAS protocol  - Pt's blood type is A POS. Rhogam is not indicated.  - DVT Score: 5 SCDs and enoxaparin prophylactic dose daily while inpatient    Contraception  s/p tubal ligation or salpingectomy    Maternal Well-Being  - emotional support provided  - infant in NICU, SW consult placed   -  feeding: breastfeeding/pumping encouraged; lactation consult prn     Dispo  - Discussed plan with anesthesia  - anticipate d/c on POD #3 if meeting all post-op and postpartum milestones  - The signs and symptoms of PEC were reviewed with the patient, including unrelenting headache, vision changes/blurred vision, and RUQ pain    - On discharge, follow up with primary OB in:       - 2 weeks for incision check       - 4-6 weeks for post-partum visit     Principal Problem:    Vasa previa  ([facility])  Active Problems:    Vasa previa affecting labor and delivery in [place] pregnancy ([facility])    Renal calculi    Anemia    Subjective   Her postoperative pain is well controlled. Pain 2/2 to PDPH is worsening.  She is passing flatus  She is not ambulating independently anymore 2/2 to dizziness.  She is tolerating a Adult diet Regular  She is voiding spontaneously  She reports no breast or nursing problems, pumping for baby  She reports emotional concerns about baby in NICU and reports no suicidal/homicidal ideation today     Seen at bedside meeting with anesthesia. Desires blood patch for worsening PDPH symptoms. ongoing neck and shoulder pain that is radiating to occiput, bilateral ear fullness, and dizziness. She reports her symptoms are worse when upright. Symptoms not well controlled with current conservative management. Tearful 2/2 unable to be with baby & unable to pump. FOB present, sleeping @ bedside.       Objective   Last Vitals:  Temp Pulse Resp BP MAP Pulse Ox   36.7 °C (98.1 °F) 69 18 (!) 142/78   97 %       Physical Exam:  General: well appearing, well nourished, postpartum  Obstetric: fundus firm below umbilicus, lochia light  Skin: Warm, dry; no rashes/lesions/erythema  Abdominal: dressing CDI  Breast: No masses, nipple discharge, pumping   Neuro: A/Ox3, no gross motor deficit   GI: no distension, appropriately tender, soft  Respiratory: Even and unlabored on RA  Cardiovascular: Trace BLE edema; No erythema, warmth  Psych: appropriate mood and affect      Lab Data:  Lab Results   Component Value Date    WBC 7.8 04/16/2024    HGB 9.5 (L) 04/16/2024    HCT 27.4 (L) 04/16/2024     04/16/2024

## 2024-04-17 LAB
LABORATORY COMMENT REPORT: NORMAL
PATH REPORT.FINAL DX SPEC: NORMAL
PATH REPORT.GROSS SPEC: NORMAL
PATH REPORT.RELEVANT HX SPEC: NORMAL
PATH REPORT.TOTAL CANCER: NORMAL

## 2024-04-17 PROCEDURE — 2500000004 HC RX 250 GENERAL PHARMACY W/ HCPCS (ALT 636 FOR OP/ED)

## 2024-04-17 PROCEDURE — A62273 PR INJ,LUMB EPIDUR,BLOOD/CLOT PATCH: Performed by: STUDENT IN AN ORGANIZED HEALTH CARE EDUCATION/TRAINING PROGRAM

## 2024-04-17 PROCEDURE — 1210000001 HC SEMI-PRIVATE ROOM DAILY

## 2024-04-17 PROCEDURE — 2500000004 HC RX 250 GENERAL PHARMACY W/ HCPCS (ALT 636 FOR OP/ED): Performed by: NURSE PRACTITIONER

## 2024-04-17 PROCEDURE — 2500000004 HC RX 250 GENERAL PHARMACY W/ HCPCS (ALT 636 FOR OP/ED): Performed by: STUDENT IN AN ORGANIZED HEALTH CARE EDUCATION/TRAINING PROGRAM

## 2024-04-17 PROCEDURE — 2500000001 HC RX 250 WO HCPCS SELF ADMINISTERED DRUGS (ALT 637 FOR MEDICARE OP)

## 2024-04-17 PROCEDURE — 2500000001 HC RX 250 WO HCPCS SELF ADMINISTERED DRUGS (ALT 637 FOR MEDICARE OP): Performed by: STUDENT IN AN ORGANIZED HEALTH CARE EDUCATION/TRAINING PROGRAM

## 2024-04-17 RX ORDER — ENOXAPARIN SODIUM 100 MG/ML
40 INJECTION SUBCUTANEOUS EVERY 24 HOURS
Status: DISCONTINUED | OUTPATIENT
Start: 2024-04-17 | End: 2024-04-18 | Stop reason: HOSPADM

## 2024-04-17 RX ORDER — BACITRACIN 500 [USP'U]/G
OINTMENT TOPICAL 3 TIMES DAILY
Status: DISCONTINUED | OUTPATIENT
Start: 2024-04-17 | End: 2024-04-18 | Stop reason: HOSPADM

## 2024-04-17 RX ADMIN — BUTALBITAL, ACETAMINOPHEN, AND CAFFEINE 1 TABLET: 50; 325; 40 TABLET ORAL at 04:08

## 2024-04-17 RX ADMIN — ACETAMINOPHEN 650 MG: 325 TABLET ORAL at 16:04

## 2024-04-17 RX ADMIN — CYCLOBENZAPRINE HYDROCHLORIDE 5 MG: 10 TABLET, FILM COATED ORAL at 01:13

## 2024-04-17 RX ADMIN — BACITRACIN: 500 OINTMENT TOPICAL at 21:51

## 2024-04-17 RX ADMIN — ACETAMINOPHEN 650 MG: 325 TABLET ORAL at 21:51

## 2024-04-17 RX ADMIN — ESCITALOPRAM 5 MG: 5 TABLET, FILM COATED ORAL at 10:06

## 2024-04-17 RX ADMIN — ACETAMINOPHEN 650 MG: 325 TABLET ORAL at 04:07

## 2024-04-17 RX ADMIN — ACETAMINOPHEN 650 MG: 325 TABLET ORAL at 10:06

## 2024-04-17 RX ADMIN — KETOROLAC TROMETHAMINE 30 MG: 30 INJECTION, SOLUTION INTRAMUSCULAR at 06:50

## 2024-04-17 RX ADMIN — ENOXAPARIN SODIUM 40 MG: 100 INJECTION SUBCUTANEOUS at 12:38

## 2024-04-17 RX ADMIN — KETOROLAC TROMETHAMINE 30 MG: 30 INJECTION, SOLUTION INTRAMUSCULAR at 00:50

## 2024-04-17 ASSESSMENT — PAIN DESCRIPTION - DESCRIPTORS
DESCRIPTORS: ACHING;SORE;DISCOMFORT
DESCRIPTORS: SORE
DESCRIPTORS: ACHING;SORE;DISCOMFORT

## 2024-04-17 ASSESSMENT — PAIN SCALES - GENERAL
PAINLEVEL_OUTOF10: 2
PAINLEVEL_OUTOF10: 0 - NO PAIN
PAINLEVEL_OUTOF10: 5 - MODERATE PAIN
PAINLEVEL_OUTOF10: 0 - NO PAIN
PAINLEVEL_OUTOF10: 2

## 2024-04-17 ASSESSMENT — PAIN - FUNCTIONAL ASSESSMENT
PAIN_FUNCTIONAL_ASSESSMENT: 0-10

## 2024-04-17 NOTE — LACTATION NOTE
Lactation Consultant Note  Lactation Consultation  Reason for Consult: Follow-up assessment  Consultant Name: JOSE Adkins, IBCLC    Maternal Information       Maternal Assessment       Infant Assessment       Feeding Assessment       LATCH TOOL       Breast Pump      Other OB Lactation Tools       Patient Follow-up       Other OB Lactation Documentation       Recommendations/Summary    Here to discuss pumping/storage of breast milk for infant in NICU. The patient is not in her room. A letter was left.

## 2024-04-17 NOTE — PROGRESS NOTES
Postpartum Progress Note    Assessment/Plan     Amaya Todd is a 33 y.o., , who initially presented for rCS in s/o vasa previa. She had a , Low Transverse  delivery on 2024  at 35w0d and is now POD3.    #PDPH  - improved symptoms  - anesthesia monitoring  - lovenox @ 0828, now on hold for possible blood patch later this evening  - fioricet prn, encouraged use  - tylenol decreased to 650 q6  - toradol x4 doses ordered in place of ibuprofen    Acute blood loss anemia:  - Hg 11.7 --> admission 10.5 --> EBL 2351 +1u PRBC in OR --> POD#0 9.4 --> POD #1 8.8 -->9.5  - PO Fe on discharge    #gHTN  - diagnosed by 2 mild range BPs > 4 hours apart (though while pt in pain/anxious)  - asymptomatic  - BP now normotensive  - HELLP labs negative and P:C Not ordered or collected  - will continue to monitor, discussed correlation of pain/anxiety to elevated BP  - BP cuff for home monitoring BID: Given cuff    Anxiety  - continue Lexapro 5mg daily  - NICU SW consult    Post-op , Low Transverse   - continue routine postoperative care  - pain well controlled on ERAS protocol  - Pt's blood type is A POS. Rhogam is not indicated.  - DVT Score: 5 SCDs and enoxaparin prophylactic dose daily while inpatient    Contraception  s/p tubal ligation or salpingectomy    Maternal Well-Being  - emotional support provided  - infant in NICU, SW consult placed   - [place] feeding: breastfeeding/pumping encouraged; lactation consult prn     Dispo  - anticipate d/c on POD #3-4 if meeting all post-op and postpartum milestones  - The signs and symptoms of PEC were reviewed with the patient, including unrelenting headache, vision changes/blurred vision, and RUQ pain    - On discharge, follow up with primary OB in:       - 1wk BP check, 2 weeks for incision check       - 4-6 weeks for post-partum visit     Principal Problem:    Vasa previa ([facility])  Active Problems:    Vasa previa affecting labor and delivery in [place]  pregnancy ([facility])    Renal calculi    Anemia    Subjective   Her postoperative pain is well controlled. Pain 2/2 to PDPH is worsening.  She is passing flatus  She is ambulating independently anymore 2/2 to dizziness.  She is tolerating a Adult diet Regular  She is voiding spontaneously  She reports no breast or nursing problems, pumping for baby. Milk came in.   She denies emotional concerns today     Seen at bedside. Denies HA, N/V, RUQ pain, vision changes. Sitting up straight in bed.     Objective   Last Vitals:  Temp Pulse Resp BP MAP Pulse Ox   36.5 °C (97.7 °F) 64 16 137/83   99 %       Physical Exam:  General: well appearing, well nourished, postpartum  Obstetric: fundus firm below umbilicus, lochia light  Skin: Warm, dry; no rashes/lesions/erythema  Abdominal: Pfannenstiel incision: clean, dry, well approximated, open to air, negative discharge/warmth/erythema; small area of ecchymosis on L abdomen  Breast: No masses, nipple discharge  Neuro: A/Ox3, no gross motor deficit   GI: no distension, appropriately tender, soft  Respiratory: Even and unlabored on RA  Cardiovascular: No BLE edema; No erythema, warmth  Psych: appropriate mood and affect      Lab Data:  Lab Results   Component Value Date    WBC 7.8 04/16/2024    HGB 9.5 (L) 04/16/2024    HCT 27.4 (L) 04/16/2024     04/16/2024

## 2024-04-17 NOTE — ANESTHESIA POSTPROCEDURE EVALUATION
Patient: Amaya Todd    Procedure Summary       Date: 04/17/24 Room / Location:     Anesthesia Start: 0013 Anesthesia Stop: 0035    Procedure: Epidural Blood Patch Diagnosis:     Scheduled Providers:  Responsible Provider: Rip Tabares DO    Anesthesia Type: epidural ASA Status: 2            Anesthesia Type: epidural    Vitals Value Taken Time   /82 04/17/24 0033   Temp 36.6 04/17/24 0035   Pulse 69 04/17/24 0033   Resp 14 04/17/24 0035   SpO2 98 % 04/17/24 0029       Anesthesia Post Evaluation    Patient location during evaluation: bedside  Patient participation: complete - patient participated  Level of consciousness: awake and alert  Pain management: adequate  Airway patency: patent  Cardiovascular status: acceptable and hemodynamically stable  Respiratory status: acceptable  Hydration status: acceptable  Postoperative Nausea and Vomiting: none        No notable events documented.

## 2024-04-17 NOTE — ANESTHESIA PROCEDURE NOTES
Blood Patch:  Patient location during procedure: OB/L&D  Start time: 4/17/2024 11:35 AM  End time: 4/17/2024 12:04 AM  Reason for Blood Patch: postdural puncture headache  Staffing  Performed: resident   Authorized by: Rip Tabares DO    Performed by: Hanny Parsons DO    Preanesthetic Checklist  Completed: patient identified, IV checked, site marked, risks and benefits discussed, surgical consent, monitors and equipment checked, pre-op evaluation, timeout performed, anesthesia consent given and sterile techniques followed  Block Timeout  RN/Licensed healthcare professional reads aloud to the Anesthesia provider and entire team: Patient identity, procedure with side and site, patient position, and as applicable the availability of implants/special equipment/special requirements.  Patient on coagulant treatment: yes (Last dose Lovenox 4/16 0830)  Timeout performed at: 4/16/2024 11:30 PM  Epidural  Patient position: sitting  Prep: Chloraprep  Sterility prep: cap, drape, gloves, hand and mask  Patient monitoring: blood pressure monitoring, continuous pulse oximetry, heart rate and blood pressure  Approach: midline  Local numbing: lidocaine 1% to skin and subcutaneous tissues  Location: L3-4  Loss of resistance technique: IDRIS saline  Guidance: landmarks    Needle type: Tuohy   Needle gauge: 19  Needle length: 9 cm cm  Needle insertion depth: 6 cm  Blood Patch  Location of venous blood draw: right arm  Injection method: epidural needle  Volume of blood injected: 20 mL  Assessment   Number of attempts: 1  Events: well tolerated  Procedure assessment: patient tolerated procedure well with no immediate complications and patient in supine position for 30-60 minutes after procedure  Postprocedure Assessment  Patient position: sitting up  Headache: improved  Patient will be discharged to home: patient not discharged home (inpatient)

## 2024-04-17 NOTE — PROGRESS NOTES
Patient seen by anesthesia on 4/17 for follow up following wet tap and epidural blood patch (4/16).  Patient reports complete resolution of symptoms associated with PDPH. She was given instructions to follow up either while recovering in the hospital as inpatient or post-discharge with Dr. Josephine Guy from chronic pain service. Anesthesia will sign off unless contacted otherwise.

## 2024-04-17 NOTE — SIGNIFICANT EVENT
Patient meets criteria for home monitoring of blood pressure post discharge.  Reason:  current gestational hypertension. Met with patient to assess for availability of home BP monitor.   Patient does not have access to BP monitor at home.  Pt agreed to order home BP monitor from [employer].   Large BP monitor delivered to room. Patient educated on how to use BP monitor. Patient educated on importance of continuing to monitor BP at home, recording BP on home monitoring log and s/sx of when to call her provider.  Pt verbalized understanding the above information.

## 2024-04-17 NOTE — LACTATION NOTE
This note was copied from a baby's chart.  Lactation Consultant Note  Lactation Consultation       Maternal Information       Maternal Assessment       Infant Assessment       Feeding Assessment       LATCH TOOL       Breast Pump       Other OB Lactation Tools       Patient Follow-up       Other OB Lactation Documentation       Recommendations/Summary  Mom states this is her 5th baby she has  and pumped for. She states pumping is going well and she is getting about 3 oz when she pumps. Mom also states infant does a good job at the breast. Mom needs a pump for home use, I ordered mom a pump through [facility]. Mom has no questions or concerns at this time, and is encouraged to contact lactation if she does.

## 2024-04-17 NOTE — CARE PLAN
The patient's goals for the shift include rest, pump/BF in NICU    The clinical goals for the shift include pain control,    Patient remained free from falls/injury throughout shift. Pain well controlled on scheduled tylenol and motrin, no s/sx of infection at this time. VSS and lochia WNL. Patient is pumping independently on an appropriate schedule and demonstrating appropriate bonding with infant in NICU. Resting comfortably in bed, denies needs at this time.

## 2024-04-17 NOTE — CONSULTS
NICU Social Work Assessment       : [place] Todd  Address: [address] [place] [address]  Phone: LILIANA - [phone] / FOB - [phone]    MOB: Amaya Todd  FOB: Teddy Todd    Referral Reason: baby transferred to the NICU--coping with illness; discharge planning    Prenatal Care: MFM    Other Children: 5, 3, & 1 y/o sons    Household Composition: parents & their three other children    IPV/DV or Safety Concerns: none noted in MOB's chart    Car-Seat: yes   Safe Sleep Space: [employer]  Safe Sleep Education: SW reviewed ABC's of safe sleep with parents; they verbalized their understanding    Transportation Concerns: none - have two vehicles    School/Work/Income: LILIANA is currently on 16 wk leave - works remotely; FOB is currently on a one week leave (, printer)    [place]: [facility] [place] will add baby to her plan    Mental Health Diagnoses: anxiety  Medication(s): managed by her OB   Counseling: currently linked - telehealth through [employer]    Supports: FOB, MGM (currently caring for the other children; lives in [place]), PGM, and parents' Gnosticism family (assisting with meals, etc)     Substance Use History: none noted in MOB's chart      Assessment: SW spoke with LILIANA and her spouse/FOB in LILIANA's pt room in [facility]. SW introduced self and role of NICU SW. LILIANA stated she is doing okay. She was receptive to completing assessment and both she & FOB participated in assessment.  LILIANA delivered baby boy at 35 wks gestation and he was transferred to the NICU due to prematurity & respiratory distress. Baby's parents are  and reside in [place] with their three other young sons. LILIANA has a history of anxiety which is treated with meds and counseling. She also reported a good support system of family members and their Gnosticism family. PABLITO reviewed PPD with LILIANA and provided her with  mood and anxiety disorders flyer along with mental health resources including National Maternal  Mental Health Hotline info. SW encouraged MOB to practice healthy self-care and to utilize her support system.   Parents [facility] [place] as pediatrician for baby and they have already scheduled appt for baby for 04/20/2024.   No psychosocial needs or concerns noted at this time.      Plan: SW provided parents with a green parking pass to assist with parking. FOB has been at the hospital with MOB & baby and plans to stay with them until discharge.   SW is available to assist if any needs or concerns arise prior to discharge (baby's anticipated discharge is tomorrow).      Signature: Elma Frank, MSW, LSW

## 2024-04-18 ENCOUNTER — PHARMACY VISIT (OUTPATIENT)
Dept: PHARMACY | Facility: CLINIC | Age: 34
End: 2024-04-18
Payer: MEDICAID

## 2024-04-18 VITALS
OXYGEN SATURATION: 97 % | DIASTOLIC BLOOD PRESSURE: 61 MMHG | SYSTOLIC BLOOD PRESSURE: 101 MMHG | HEIGHT: 68 IN | WEIGHT: 226.41 LBS | HEART RATE: 99 BPM | RESPIRATION RATE: 20 BRPM | BODY MASS INDEX: 34.31 KG/M2 | TEMPERATURE: 97.7 F

## 2024-04-18 PROCEDURE — 2500000001 HC RX 250 WO HCPCS SELF ADMINISTERED DRUGS (ALT 637 FOR MEDICARE OP)

## 2024-04-18 PROCEDURE — RXMED WILLOW AMBULATORY MEDICATION CHARGE

## 2024-04-18 RX ORDER — OXYCODONE HYDROCHLORIDE 5 MG/1
5 TABLET ORAL EVERY 4 HOURS PRN
Qty: 5 TABLET | Refills: 0 | Status: SHIPPED | OUTPATIENT
Start: 2024-04-18 | End: 2024-04-18

## 2024-04-18 RX ORDER — OXYCODONE HYDROCHLORIDE 5 MG/1
5 TABLET ORAL EVERY 4 HOURS PRN
Qty: 5 TABLET | Refills: 0 | Status: SHIPPED | OUTPATIENT
Start: 2024-04-18 | End: 2024-05-24 | Stop reason: WASHOUT

## 2024-04-18 RX ORDER — ACETAMINOPHEN 325 MG/1
650 TABLET ORAL EVERY 6 HOURS
Qty: 90 TABLET | Refills: 0 | Status: SHIPPED | OUTPATIENT
Start: 2024-04-18

## 2024-04-18 RX ORDER — IBUPROFEN 600 MG/1
600 TABLET ORAL EVERY 6 HOURS SCHEDULED
Qty: 30 TABLET | Refills: 0 | Status: SHIPPED | OUTPATIENT
Start: 2024-04-18 | End: 2024-04-18

## 2024-04-18 RX ORDER — ACETAMINOPHEN 325 MG/1
650 TABLET ORAL EVERY 6 HOURS
Qty: 90 TABLET | Refills: 0 | Status: SHIPPED | OUTPATIENT
Start: 2024-04-18 | End: 2024-04-18

## 2024-04-18 RX ORDER — IBUPROFEN 600 MG/1
600 TABLET ORAL EVERY 6 HOURS SCHEDULED
Qty: 30 TABLET | Refills: 0 | Status: SHIPPED | OUTPATIENT
Start: 2024-04-18

## 2024-04-18 RX ORDER — IBUPROFEN 600 MG/1
600 TABLET ORAL EVERY 6 HOURS SCHEDULED
Status: DISCONTINUED | OUTPATIENT
Start: 2024-04-18 | End: 2024-04-18 | Stop reason: HOSPADM

## 2024-04-18 RX ADMIN — IBUPROFEN 600 MG: 600 TABLET, FILM COATED ORAL at 10:44

## 2024-04-18 RX ADMIN — ACETAMINOPHEN 650 MG: 325 TABLET ORAL at 04:37

## 2024-04-18 RX ADMIN — ACETAMINOPHEN 650 MG: 325 TABLET ORAL at 10:43

## 2024-04-18 RX ADMIN — IBUPROFEN 600 MG: 600 TABLET, FILM COATED ORAL at 04:37

## 2024-04-18 ASSESSMENT — PAIN DESCRIPTION - DESCRIPTORS
DESCRIPTORS: SORE
DESCRIPTORS: CRAMPING;SORE

## 2024-04-18 ASSESSMENT — PAIN SCALES - GENERAL
PAINLEVEL_OUTOF10: 2
PAINLEVEL_OUTOF10: 4

## 2024-04-18 ASSESSMENT — PAIN - FUNCTIONAL ASSESSMENT: PAIN_FUNCTIONAL_ASSESSMENT: 0-10

## 2024-04-18 NOTE — DISCHARGE SUMMARY
Discharge Summary    Admission Date: 2024  Discharge Date: 24      Discharge Diagnosis  Encounter for  delivery without indication ([facility])    Hospital Course  Delivery Date: 2024  10:39 AM   Delivery type: , Low Transverse    GA at delivery: 35w0d  Outcome: Living   Anesthesia during delivery: Spinal   Intrapartum complications: Hemorrhage   Feeding method: Breastfeeding Status: Yes     Procedures: none  Contraception at discharge: none  Just back from NICU, baby being discharged today.  Spinal HA has resolved, pt. Very happy/pleasant.  No questions or concers.  Follow up with Dr. Melendrez    Pertinent Physical Exam At Time of Discharge  General: Examination reveals a well developed, well nourished, female, in no acute distress. She is alert and cooperative.  Lungs: symmetrical, non-labored breathing.  Cardiac: warm, well-perfused.  Abdomen: soft, non-tender.  Fundus: firm, at umbilicus, and nontender.  Extremities: no redness or tenderness in the calves or thighs.  Neurological: alert, oriented, normal speech, no focal findings or movement disorder noted.       Discharge Meds     Your medication list        START taking these medications        Instructions Last Dose Given Next Dose Due   acetaminophen 325 mg tablet  Commonly known as: Tylenol      Take 2 tablets (650 mg) by mouth every 6 hours.       ibuprofen 600 mg tablet      Take 1 tablet (600 mg) by mouth every 6 hours.       oxyCODONE 5 mg immediate release tablet  Commonly known as: Roxicodone      Take 1 tablet (5 mg) by mouth every 4 hours if needed (Pain score 6-8).              CONTINUE taking these medications        Instructions Last Dose Given Next Dose Due   escitalopram 5 mg tablet  Commonly known as: Lexapro      Take 1 tablet (5 mg) by mouth once daily.              STOP taking these medications      hydrOXYzine HCL 10 mg tablet  Commonly known as: Atarax        polyethylene glycol 17 gram packet  Commonly known as:  Glycolax, Miralax        Prenatal Vitamin 27 mg iron- 800 mcg tablet  Generic drug: prenatal vit no.124-iron-folic        Tums 200 mg calcium chewable tablet  Generic drug: calcium carbonate                  Where to Get Your Medications        These medications were sent to [facility]  [address], [address], [place] [id number]      Hours: 8AM to 6PM Mon-Fri, 8AM to 4PM Sat, 9AM to 1PM Sun Phone: [phone]   acetaminophen 325 mg tablet  ibuprofen 600 mg tablet  oxyCODONE 5 mg immediate release tablet          Complications Requiring Follow-Up  Heavy vaginal bleeding, passing clots, fever and/or chills      Test Results Pending At Discharge  Pending Labs       No current pending labs.            Outpatient Follow-Up  Future Appointments   Date Time Provider Department Center   4/26/2024  1:30 PM Brien Melendrez MD [id number]        spent 8 minutes in the professional and overall care of this patient.      Darcy Gordon, APRN-CNP

## 2024-04-18 NOTE — LACTATION NOTE
This note was copied from a baby's chart.  Lactation Consultant Note  Lactation Consultation       Maternal Information       Maternal Assessment       Infant Assessment       Feeding Assessment       LATCH TOOL       Breast Pump       Other OB Lactation Tools       Patient Follow-up       Other OB Lactation Documentation       Recommendations/Summary  Reviewed Breastfeeding discharge information: Electric Breast Pumps & Milk Storage for Your Healthy Baby, Breast-feeding: Tips to Increase Your Milk Supply, Is My Breast-fed Baby Getting Enough to Eat?, Nursing Your Baby,  Lactation Center Information, [facility] Breastfeeding & safe sleep information, [facility] Breastfeeding Hotline.  Your baby should nurse a minimum of 8-12 times in 24 hours (every 2-3 hours). Wake a sleepy baby every 3 hours to feed, even during the night. The more often you nurse, the more milk your body will produce. Burp your baby when switching sides and at the end of a feeding. Expect at least 1 wet diaper per day for the first 2 days, increasing to 6-8 diapers per day by day 7 of age. Lactation center information and 24hr breastfeeding support hotline.

## 2024-04-18 NOTE — CARE PLAN
The patient's goals for the shift include to go home    The clinical goals for the shift include continue without headache    Over the shift the patient did make progress toward the following goals. No barriers. Pumping consistently The patients pain well controlled.No narcotics required.

## 2024-04-18 NOTE — DISCHARGE INSTR - ACTIVITY
As tolerated no heavy lifting.No lifting anything over ten pounds.Pelvic rest.No exercising for six weeks.

## 2024-04-22 ENCOUNTER — APPOINTMENT (OUTPATIENT)
Dept: OBSTETRICS AND GYNECOLOGY | Facility: CLINIC | Age: 34
End: 2024-04-22
Payer: COMMERCIAL

## 2024-04-26 ENCOUNTER — OFFICE VISIT (OUTPATIENT)
Dept: MATERNAL FETAL MEDICINE | Facility: CLINIC | Age: 34
End: 2024-04-26
Payer: COMMERCIAL

## 2024-04-26 VITALS
WEIGHT: 208.5 LBS | BODY MASS INDEX: 31.7 KG/M2 | HEART RATE: 87 BPM | SYSTOLIC BLOOD PRESSURE: 144 MMHG | DIASTOLIC BLOOD PRESSURE: 86 MMHG

## 2024-04-26 DIAGNOSIS — O13.9 GESTATIONAL HYPERTENSION, ANTEPARTUM (HHS-HCC): ICD-10-CM

## 2024-04-26 DIAGNOSIS — F41.9 ANXIETY: ICD-10-CM

## 2024-04-26 PROCEDURE — 99214 OFFICE O/P EST MOD 30 MIN: CPT | Performed by: OBSTETRICS & GYNECOLOGY

## 2024-04-26 PROCEDURE — 1036F TOBACCO NON-USER: CPT | Performed by: OBSTETRICS & GYNECOLOGY

## 2024-04-26 PROCEDURE — 3077F SYST BP >= 140 MM HG: CPT | Performed by: OBSTETRICS & GYNECOLOGY

## 2024-04-26 PROCEDURE — 3079F DIAST BP 80-89 MM HG: CPT | Performed by: OBSTETRICS & GYNECOLOGY

## 2024-04-26 ASSESSMENT — EDINBURGH POSTNATAL DEPRESSION SCALE (EPDS)
I HAVE BLAMED MYSELF UNNECESSARILY WHEN THINGS WENT WRONG: YES, SOME OF THE TIME
TOTAL SCORE: 8
THE THOUGHT OF HARMING MYSELF HAS OCCURRED TO ME: NEVER
I HAVE FELT SCARED OR PANICKY FOR NO GOOD REASON: NO, NOT MUCH
I HAVE BEEN ABLE TO LAUGH AND SEE THE FUNNY SIDE OF THINGS: AS MUCH AS I ALWAYS COULD
I HAVE FELT SAD OR MISERABLE: NOT VERY OFTEN
I HAVE BEEN SO UNHAPPY THAT I HAVE HAD DIFFICULTY SLEEPING: NOT AT ALL
I HAVE BEEN ANXIOUS OR WORRIED FOR NO GOOD REASON: YES, SOMETIMES
I HAVE BEEN SO UNHAPPY THAT I HAVE BEEN CRYING: ONLY OCCASIONALLY
I HAVE LOOKED FORWARD WITH ENJOYMENT TO THINGS: AS MUCH AS I EVER DID
THINGS HAVE BEEN GETTING ON TOP OF ME: NO, MOST OF THE TIME I HAVE COPED QUITE WELL

## 2024-04-26 ASSESSMENT — PAIN SCALES - GENERAL: PAINLEVEL: 0-NO PAIN

## 2024-04-26 NOTE — PROGRESS NOTES
Subjective   Amaya Todd is a 33 y.o. female who presents for a postpartum visit. She is 2 weeks postpartum following a  repeat  . I have fully reviewed the prenatal and intrapartum course. The delivery was at 35 gestational weeks. Anesthesia: epidural, spinal, and IV sedation. Postpartum course has been complicated by spinal headache, gHTN. Baby's course has been uncomplicated. Baby is feeding by breast. Bleeding thin lochia. Bowel function is normal. Bladder function is normal. Patient is not sexually active. Contraception method is none. Postpartum depression screening:  10 .      Objective   /86   Pulse 87   Wt 94.6 kg (208 lb 8 oz)   LMP 2023   Breastfeeding Yes   BMI 31.70 kg/m²    General:  alert and oriented, in no acute distress   Lungs: Normal respiratory effort   Heart:  Warm, well perfused   Abdomen: soft, non-tender; bowel sounds normal; no masses, no organomegaly and incision c/d/i     Assessment/Plan     PP  - meeting milestones  - surg path reviewed, no further follow up required  - PPBC: plans vasectomy  - last pap 2023 NIL neg HPV     Anxiety  -Following with counselor  - Still having some depressed mood and anxiety, but reports feeling better following delivery.  Self discontinued lexapro as she feels she no longer needs it.  Precautions reviewed.     gHTN  -Mild range today.  - Has been checking Ps at home, reports highest in 130s/80s     RTC 4 weeks for PP visit.    Brien Melendrez MD

## 2024-04-29 ENCOUNTER — APPOINTMENT (OUTPATIENT)
Dept: OBSTETRICS AND GYNECOLOGY | Facility: CLINIC | Age: 34
End: 2024-04-29
Payer: COMMERCIAL

## 2024-05-06 ENCOUNTER — APPOINTMENT (OUTPATIENT)
Dept: OBSTETRICS AND GYNECOLOGY | Facility: CLINIC | Age: 34
End: 2024-05-06
Payer: COMMERCIAL

## 2024-05-13 ENCOUNTER — APPOINTMENT (OUTPATIENT)
Dept: OBSTETRICS AND GYNECOLOGY | Facility: CLINIC | Age: 34
End: 2024-05-13
Payer: COMMERCIAL

## 2024-05-13 ENCOUNTER — TELEPHONE (OUTPATIENT)
Dept: MATERNAL FETAL MEDICINE | Facility: CLINIC | Age: 34
End: 2024-05-13

## 2024-05-13 NOTE — TELEPHONE ENCOUNTER
Called patient to discuss MyChart message regarding incision.  Patient has small area that is open, states just skin layer, small enough to cover with bandaid, small amount of bleeding. Denies fevers, chills, redness.  Discussed with Dr. Melendrez, will have patient monitor incision and call with any changes.

## 2024-05-20 ENCOUNTER — APPOINTMENT (OUTPATIENT)
Dept: OBSTETRICS AND GYNECOLOGY | Facility: CLINIC | Age: 34
End: 2024-05-20
Payer: COMMERCIAL

## 2024-05-24 ENCOUNTER — OFFICE VISIT (OUTPATIENT)
Dept: MATERNAL FETAL MEDICINE | Facility: CLINIC | Age: 34
End: 2024-05-24
Payer: COMMERCIAL

## 2024-05-24 VITALS
HEIGHT: 68 IN | WEIGHT: 210 LBS | BODY MASS INDEX: 31.83 KG/M2 | HEART RATE: 81 BPM | SYSTOLIC BLOOD PRESSURE: 139 MMHG | DIASTOLIC BLOOD PRESSURE: 99 MMHG

## 2024-05-24 DIAGNOSIS — Z30.011 ENCOUNTER FOR INITIAL PRESCRIPTION OF CONTRACEPTIVE PILLS: ICD-10-CM

## 2024-05-24 PROCEDURE — 1036F TOBACCO NON-USER: CPT | Performed by: OBSTETRICS & GYNECOLOGY

## 2024-05-24 PROCEDURE — 99214 OFFICE O/P EST MOD 30 MIN: CPT | Performed by: OBSTETRICS & GYNECOLOGY

## 2024-05-24 RX ORDER — NORETHINDRONE 0.35 MG/1
1 TABLET ORAL DAILY
Qty: 28 TABLET | Refills: 11 | Status: SHIPPED | OUTPATIENT
Start: 2024-05-24 | End: 2025-05-24

## 2024-05-24 ASSESSMENT — EDINBURGH POSTNATAL DEPRESSION SCALE (EPDS)
I HAVE BEEN ANXIOUS OR WORRIED FOR NO GOOD REASON: YES, SOMETIMES
I HAVE FELT SCARED OR PANICKY FOR NO GOOD REASON: YES, SOMETIMES
I HAVE BLAMED MYSELF UNNECESSARILY WHEN THINGS WENT WRONG: NOT VERY OFTEN
THINGS HAVE BEEN GETTING ON TOP OF ME: YES, SOMETIMES I HAVEN'T BEEN COPING AS WELL AS USUAL
TOTAL SCORE: 9
I HAVE FELT SAD OR MISERABLE: NOT VERY OFTEN
THE THOUGHT OF HARMING MYSELF HAS OCCURRED TO ME: NEVER
I HAVE BEEN SO UNHAPPY THAT I HAVE HAD DIFFICULTY SLEEPING: NOT AT ALL
I HAVE BEEN SO UNHAPPY THAT I HAVE BEEN CRYING: ONLY OCCASIONALLY
I HAVE LOOKED FORWARD WITH ENJOYMENT TO THINGS: AS MUCH AS I EVER DID
I HAVE BEEN ABLE TO LAUGH AND SEE THE FUNNY SIDE OF THINGS: AS MUCH AS I ALWAYS COULD

## 2024-05-24 ASSESSMENT — PAIN SCALES - GENERAL: PAINLEVEL: 0-NO PAIN

## 2024-09-06 ENCOUNTER — OFFICE VISIT (OUTPATIENT)
Dept: UROLOGY | Facility: CLINIC | Age: 34
End: 2024-09-06
Payer: COMMERCIAL

## 2024-09-06 VITALS — BODY MASS INDEX: 33.95 KG/M2 | HEIGHT: 68 IN | WEIGHT: 224 LBS

## 2024-09-06 DIAGNOSIS — R10.9 FLANK PAIN: ICD-10-CM

## 2024-09-06 DIAGNOSIS — R31.0 GROSS HEMATURIA: Primary | ICD-10-CM

## 2024-09-06 LAB
POC APPEARANCE, URINE: CLEAR
POC BILIRUBIN, URINE: NEGATIVE
POC BLOOD, URINE: NEGATIVE
POC COLOR, URINE: YELLOW
POC GLUCOSE, URINE: NEGATIVE MG/DL
POC KETONES, URINE: NEGATIVE MG/DL
POC LEUKOCYTES, URINE: NEGATIVE
POC NITRITE,URINE: NEGATIVE
POC PH, URINE: 6 PH
POC PROTEIN, URINE: NEGATIVE MG/DL
POC SPECIFIC GRAVITY, URINE: 1.01
POC UROBILINOGEN, URINE: 0.2 EU/DL

## 2024-09-06 PROCEDURE — 87086 URINE CULTURE/COLONY COUNT: CPT

## 2024-09-06 PROCEDURE — 81001 URINALYSIS AUTO W/SCOPE: CPT

## 2024-09-06 ASSESSMENT — PAIN SCALES - GENERAL: PAINLEVEL: 0-NO PAIN

## 2024-09-06 NOTE — PROGRESS NOTES
[facility]  Outpatient Clinic Note    Patient: Amaya Todd  Age/Sex: 33 y.o., female  MRN: [id number]  Referred by: Dr. Vazquez ref. provider found     Chief Complaint: Gross hematuria         History of Present Illness  This is a 33 y.o. female,  who presents as a new patient to the clinic for gross hematuria that started on 9/3. She had left sided flank pain that radiated to her left groin. The symptoms have since resolved. The gross hematuria happened once before last year while she was pregnant.  She has a past medical history of kidney stones. She denies dysuria, pelvic pain, vaginal bulging, fever or chills.  The patient denies urinary incontinence.  The patient stated her bowel movements are back and forth with constipation. She is sexually active, denies pain or vaginal dryness. Previous abdominal/pelvic surgeries of 4 c-sections.  The patient is a former cigarette smoker she quit 7 years ago she reports she only smoked for about 2 years smoking 2 to 3 cigarettes a day.  Denies a history of breast cancer.  Patient is postpartum, she gave birth 5 months ago.        OB History          5    Para   4    Term   3       1    AB   1    Living   4         SAB   1    IAB   0    Ectopic   0    Multiple   0    Live Births   4                 Past Medical & Surgical History  Past Medical History:   Diagnosis Date    Anxiety     Kidney stones      Past Surgical History:   Procedure Laterality Date     SECTION, LOW TRANSVERSE  10/28/2022     section    DILATION AND CURETTAGE OF UTERUS  10/31/2019    Dilation and curettage    WISDOM TOOTH EXTRACTION  2020    [place] tooth extraction       Family History  No family history on file.    Social History  She reports that she has never smoked. She has never used smokeless tobacco. She reports that she does not currently use alcohol. She reports that she does not use drugs.    Allergies  Patient has no known  allergies.    Medications:  Current Outpatient Medications on File Prior to Visit   Medication Sig Dispense Refill    acetaminophen (Tylenol) 325 mg tablet Take 2 tablets (650 mg) by mouth every 6 hours. 90 tablet 0    ibuprofen 600 mg tablet Take 1 tablet (600 mg) by mouth every 6 hours. 30 tablet 0    norethindrone (Micronor) 0.35 mg tablet Take 1 tablet (0.35 mg) over 28 days by mouth once daily. 28 tablet 11     No current facility-administered medications on file prior to visit.      There were no vitals filed for this visit.  Body mass index is 34.06 kg/m².    Review of Systems   A comprehensive 10+ review of systems was negative except for: see hpi          Physical Exam                                                                                                                      General: Well developed, well nourished, alert and cooperative, appears in no acute distress  Head: Normocephalic, atraumatic  Neck: supple, trachea midline  Eyes: Non-injected conjunctiva, sclera clear, no proptosis  Cardiac: Extremities are warm and well perfused. No edema, cyanosis or pallor.   Lungs: Breathing is easy, non-labored. Speaking in clear and complete sentences. Normal diaphragmatic movement.  Abdomen: soft, non-distended, non-tender, no rebound or guarding, no hernia and no CVA tenderness   MSK: Ambulatory with steady gait, unassisted  Neuro: alert and oriented to person, place and time  Psych: Demonstrates good judgement and reason, without hallucinations, abnormal affect or abnormal behaviors.  Skin: no obvious lesions, no rashes    PVR (by Ultrasound):  160mL  Urine dip:   Recent Results (from the past 6 hour(s))   POCT UA Automated manually resulted    Collection Time: 09/06/24 10:21 AM   Result Value Ref Range    POC Color, Urine Yellow Straw, Yellow, Light-Yellow    POC Appearance, Urine Clear Clear    POC Glucose, Urine NEGATIVE NEGATIVE mg/dl    POC Bilirubin, Urine NEGATIVE NEGATIVE    POC Ketones,  Urine NEGATIVE NEGATIVE mg/dl    POC Specific Gravity, Urine 1.010 1.005 - 1.035    POC Blood, Urine NEGATIVE NEGATIVE    POC PH, Urine 6.0 No Reference Range Established PH    POC Protein, Urine NEGATIVE NEGATIVE, 30 (1+) mg/dl    POC Urobilinogen, Urine 0.2 0.2, 1.0 EU/DL    Poc Nitrite, Urine NEGATIVE NEGATIVE    POC Leukocytes, Urine NEGATIVE NEGATIVE       Labs  N/A    Imaging  N/A      IMPRESSION AND PLAN:  Amaya Todd is a 33 y.o. presents with gross hematuria.  Patient has a past medical history of kidney stones.    Gross Hematuria:  We will need CT urogram and cystoscopy    To address the patient’s hematuria, I recommended the patient follow up with diagnostic hematuria workup which includes cystoscopy, urine culture, and CT Urogram. Patient is aware of the risks associated with intravenous contrast. There is no history of renal dysfunction. Risks of cystoscopy were discussed with the patient in great detail, including the risk of hematuria, UTI and discomfort. Patient understands and desires to proceed.      Please follow-up in 3 months    All questions and concerns were answered and addressed.  The patient expressed understanding and agrees with the plan.     Reviewed and approved by OCTAVIO BARAHONA on 9/6/24 at 12:27 PM.

## 2024-09-07 LAB
MUCOUS THREADS #/AREA URNS AUTO: NORMAL /LPF
RBC #/AREA URNS AUTO: NORMAL /HPF
SQUAMOUS #/AREA URNS AUTO: NORMAL /HPF
WBC #/AREA URNS AUTO: NORMAL /HPF

## 2024-09-08 LAB — BACTERIA UR CULT: NO GROWTH

## 2024-09-09 ENCOUNTER — TELEPHONE (OUTPATIENT)
Dept: UROLOGY | Facility: CLINIC | Age: 34
End: 2024-09-09
Payer: COMMERCIAL

## 2024-09-09 NOTE — TELEPHONE ENCOUNTER
----- Message from Angélica Jaron sent at 9/9/2024  7:29 AM EDT -----  Will you call her let her know her urine culture is negative? I see her Ct urogram is scheduled, we will call her once we get those results.

## 2024-09-09 NOTE — RESULT ENCOUNTER NOTE
Will you call her let her know her urine culture is negative? I see her Ct urogram is scheduled, we will call her once we get those results.

## 2024-09-13 ENCOUNTER — TELEPHONE (OUTPATIENT)
Dept: UROLOGY | Facility: CLINIC | Age: 34
End: 2024-09-13
Payer: COMMERCIAL

## 2024-09-13 DIAGNOSIS — R31.0 GROSS HEMATURIA: Primary | ICD-10-CM

## 2024-09-14 ENCOUNTER — LAB (OUTPATIENT)
Dept: LAB | Facility: LAB | Age: 34
End: 2024-09-14
Payer: COMMERCIAL

## 2024-09-14 DIAGNOSIS — R31.0 GROSS HEMATURIA: ICD-10-CM

## 2024-09-14 LAB
ANION GAP SERPL CALC-SCNC: 14 MMOL/L (ref 10–20)
BUN SERPL-MCNC: 18 MG/DL (ref 6–23)
CALCIUM SERPL-MCNC: 9 MG/DL (ref 8.6–10.3)
CHLORIDE SERPL-SCNC: 103 MMOL/L (ref 98–107)
CO2 SERPL-SCNC: 27 MMOL/L (ref 21–32)
CREAT SERPL-MCNC: 0.72 MG/DL (ref 0.5–1.05)
EGFRCR SERPLBLD CKD-EPI 2021: >90 ML/MIN/1.73M*2
GLUCOSE SERPL-MCNC: 80 MG/DL (ref 74–99)
POTASSIUM SERPL-SCNC: 4.3 MMOL/L (ref 3.5–5.3)
SODIUM SERPL-SCNC: 140 MMOL/L (ref 136–145)

## 2024-09-14 PROCEDURE — 80048 BASIC METABOLIC PNL TOTAL CA: CPT

## 2024-09-14 PROCEDURE — 36415 COLL VENOUS BLD VENIPUNCTURE: CPT

## 2024-09-20 ENCOUNTER — HOSPITAL ENCOUNTER (OUTPATIENT)
Dept: RADIOLOGY | Facility: CLINIC | Age: 34
Discharge: HOME | End: 2024-09-20
Payer: COMMERCIAL

## 2024-09-20 DIAGNOSIS — R31.0 GROSS HEMATURIA: ICD-10-CM

## 2024-09-20 PROCEDURE — 2550000001 HC RX 255 CONTRASTS

## 2024-09-20 PROCEDURE — 76377 3D RENDER W/INTRP POSTPROCES: CPT

## 2024-10-01 ENCOUNTER — APPOINTMENT (OUTPATIENT)
Dept: UROLOGY | Facility: CLINIC | Age: 34
End: 2024-10-01
Payer: COMMERCIAL

## 2024-10-01 DIAGNOSIS — R31.0 GROSS HEMATURIA: Primary | ICD-10-CM

## 2024-10-01 PROCEDURE — 1036F TOBACCO NON-USER: CPT

## 2024-10-01 PROCEDURE — 99214 OFFICE O/P EST MOD 30 MIN: CPT

## 2024-10-01 NOTE — PROGRESS NOTES
[facility]  Outpatient Clinic Note    Patient: Amaya Todd  Age/Sex: 34 y.o., female  MRN: [id number]  Virtual Visit: An interactive audio and video telecommunication system which permits real time communications between the patient (at the originating site) and provider (at the distant site) was utilized to provide this telehealth service. Verbal consent was requested and obtained from Amaya Todd on this date 10/1/2024 for a telehealth visit.     Chief Complaint: Follow-up visit         History of Present Illness  This is a 34 y.o. female, who presents for her follow-up visit.  The patient's recent BMP was unremarkable.  The patient's recent CT urogram demonstrated bilateral small kidney stones that are nonobstructing.  The scan was negative for hydronephrosis.  The patient denies dysuria, flank pain, pelvic pain, nausea, vomiting, fever or chills.           Past Medical & Surgical History  Past Medical History:   Diagnosis Date    Anxiety     Kidney stones      Past Surgical History:   Procedure Laterality Date     SECTION, LOW TRANSVERSE  10/28/2022     section    DILATION AND CURETTAGE OF UTERUS  10/31/2019    Dilation and curettage    WISDOM TOOTH EXTRACTION  2020    [place] tooth extraction       Family History  No family history on file.    Social History  She reports that she has never smoked. She has never used smokeless tobacco. She reports that she does not currently use alcohol. She reports that she does not use drugs.    Allergies  Patient has no known allergies.    Medications:  Current Outpatient Medications on File Prior to Visit   Medication Sig Dispense Refill    acetaminophen (Tylenol) 325 mg tablet Take 2 tablets (650 mg) by mouth every 6 hours. 90 tablet 0    ibuprofen 600 mg tablet Take 1 tablet (600 mg) by mouth every 6 hours. 30 tablet 0    norethindrone (Micronor) 0.35 mg tablet Take 1 tablet (0.35 mg) over 28 days by mouth once daily. 28 tablet 11     No  current facility-administered medications on file prior to visit.        Review of Systems   A comprehensive 10+ review of systems was negative except for: see hpi          Physical Exam                                                                                                                      General: Well developed, well nourished, alert and cooperative, appears in no acute distress  Eyes: no proptosis  Lungs: Breathing is easy, non-labored while speaking in clear and complete sentences.   Neuro: alert and oriented to person, place and time  Psych: Demonstrates good judgement and reason, without hallucinations, abnormal affect or abnormal behaviors.  Skin: no obvious lesions, no rashes      Labs  N/A    Imaging  N/A    IMPRESSION AND PLAN:  Amaya Todd is a 34 y.o.  presents with gross hematuria.  Patient has a past medical history of kidney stones. The patient is a former cigarette smoker she quit 7 years ago she reports she only smoked for about 2 years smoking 2 to 3 cigarettes a day.       Gross Hematuria:  -Small Nonobstructing kidney stones seen on the CT urogram  -We discussed follow-up with Dr. Etienne for a cystoscopy  -Patient is agreeable to the plan    Patient will follow-up with her PCP regarding liver lesion findings on her CT urogram that have been unchanged for the last year.     Please follow-up with Dr. Etienne    All questions and concerns were answered and addressed.  The patient expressed understanding and agrees with the plan.     Reviewed and approved by OCTAVIO BARAHONA on 10/1/24 at 7:28 AM.

## 2024-11-15 ENCOUNTER — APPOINTMENT (OUTPATIENT)
Dept: UROLOGY | Facility: CLINIC | Age: 34
End: 2024-11-15
Payer: COMMERCIAL

## 2024-12-03 ENCOUNTER — APPOINTMENT (OUTPATIENT)
Dept: UROLOGY | Facility: CLINIC | Age: 34
End: 2024-12-03
Payer: COMMERCIAL

## 2025-02-08 ENCOUNTER — APPOINTMENT (OUTPATIENT)
Dept: PRIMARY CARE | Facility: CLINIC | Age: 35
End: 2025-02-08
Payer: COMMERCIAL

## 2025-02-21 ENCOUNTER — HOSPITAL ENCOUNTER (EMERGENCY)
Facility: HOSPITAL | Age: 35
Discharge: HOME | End: 2025-02-21
Attending: EMERGENCY MEDICINE
Payer: COMMERCIAL

## 2025-02-21 ENCOUNTER — APPOINTMENT (OUTPATIENT)
Dept: RADIOLOGY | Facility: HOSPITAL | Age: 35
End: 2025-02-21
Payer: COMMERCIAL

## 2025-02-21 VITALS
SYSTOLIC BLOOD PRESSURE: 124 MMHG | BODY MASS INDEX: 34.86 KG/M2 | OXYGEN SATURATION: 96 % | HEIGHT: 68 IN | WEIGHT: 230 LBS | HEART RATE: 99 BPM | RESPIRATION RATE: 18 BRPM | TEMPERATURE: 97.1 F | DIASTOLIC BLOOD PRESSURE: 86 MMHG

## 2025-02-21 DIAGNOSIS — K80.50 BILIARY COLIC: Primary | ICD-10-CM

## 2025-02-21 LAB
ALBUMIN SERPL BCP-MCNC: 4.6 G/DL (ref 3.4–5)
ALP SERPL-CCNC: 49 U/L (ref 33–110)
ALT SERPL W P-5'-P-CCNC: 10 U/L (ref 7–45)
ANION GAP SERPL CALC-SCNC: 13 MMOL/L (ref 10–20)
APPEARANCE UR: ABNORMAL
AST SERPL W P-5'-P-CCNC: 14 U/L (ref 9–39)
BASOPHILS # BLD AUTO: 0.02 X10*3/UL (ref 0–0.1)
BASOPHILS NFR BLD AUTO: 0.3 %
BILIRUB DIRECT SERPL-MCNC: 0.1 MG/DL (ref 0–0.3)
BILIRUB SERPL-MCNC: 0.4 MG/DL (ref 0–1.2)
BILIRUB UR STRIP.AUTO-MCNC: NEGATIVE MG/DL
BUN SERPL-MCNC: 15 MG/DL (ref 6–23)
CALCIUM SERPL-MCNC: 9.2 MG/DL (ref 8.6–10.3)
CHLORIDE SERPL-SCNC: 107 MMOL/L (ref 98–107)
CO2 SERPL-SCNC: 23 MMOL/L (ref 21–32)
COLOR UR: ABNORMAL
CREAT SERPL-MCNC: 0.74 MG/DL (ref 0.5–1.05)
EGFRCR SERPLBLD CKD-EPI 2021: >90 ML/MIN/1.73M*2
EOSINOPHIL # BLD AUTO: 0.01 X10*3/UL (ref 0–0.7)
EOSINOPHIL NFR BLD AUTO: 0.2 %
ERYTHROCYTE [DISTWIDTH] IN BLOOD BY AUTOMATED COUNT: 14.5 % (ref 11.5–14.5)
GLUCOSE SERPL-MCNC: 88 MG/DL (ref 74–99)
GLUCOSE UR STRIP.AUTO-MCNC: NORMAL MG/DL
HCT VFR BLD AUTO: 40.2 % (ref 36–46)
HGB BLD-MCNC: 13.1 G/DL (ref 12–16)
HOLD SPECIMEN: NORMAL
IMM GRANULOCYTES # BLD AUTO: 0.02 X10*3/UL (ref 0–0.7)
IMM GRANULOCYTES NFR BLD AUTO: 0.3 % (ref 0–0.9)
KETONES UR STRIP.AUTO-MCNC: NEGATIVE MG/DL
LEUKOCYTE ESTERASE UR QL STRIP.AUTO: NEGATIVE
LIPASE SERPL-CCNC: 20 U/L (ref 9–82)
LYMPHOCYTES # BLD AUTO: 1.8 X10*3/UL (ref 1.2–4.8)
LYMPHOCYTES NFR BLD AUTO: 30.7 %
MCH RBC QN AUTO: 29.2 PG (ref 26–34)
MCHC RBC AUTO-ENTMCNC: 32.6 G/DL (ref 32–36)
MCV RBC AUTO: 90 FL (ref 80–100)
MONOCYTES # BLD AUTO: 0.33 X10*3/UL (ref 0.1–1)
MONOCYTES NFR BLD AUTO: 5.6 %
NEUTROPHILS # BLD AUTO: 3.69 X10*3/UL (ref 1.2–7.7)
NEUTROPHILS NFR BLD AUTO: 62.9 %
NITRITE UR QL STRIP.AUTO: NEGATIVE
NRBC BLD-RTO: 0 /100 WBCS (ref 0–0)
PH UR STRIP.AUTO: 6 [PH]
PLATELET # BLD AUTO: 227 X10*3/UL (ref 150–450)
POTASSIUM SERPL-SCNC: 4 MMOL/L (ref 3.5–5.3)
PROT SERPL-MCNC: 7.2 G/DL (ref 6.4–8.2)
PROT UR STRIP.AUTO-MCNC: NEGATIVE MG/DL
RBC # BLD AUTO: 4.49 X10*6/UL (ref 4–5.2)
RBC # UR STRIP.AUTO: NEGATIVE MG/DL
SODIUM SERPL-SCNC: 139 MMOL/L (ref 136–145)
SP GR UR STRIP.AUTO: 1.02
UROBILINOGEN UR STRIP.AUTO-MCNC: NORMAL MG/DL
WBC # BLD AUTO: 5.9 X10*3/UL (ref 4.4–11.3)

## 2025-02-21 PROCEDURE — 99284 EMERGENCY DEPT VISIT MOD MDM: CPT | Mod: 25 | Performed by: EMERGENCY MEDICINE

## 2025-02-21 PROCEDURE — 81003 URINALYSIS AUTO W/O SCOPE: CPT | Performed by: PHYSICIAN ASSISTANT

## 2025-02-21 PROCEDURE — 83690 ASSAY OF LIPASE: CPT | Performed by: PHYSICIAN ASSISTANT

## 2025-02-21 PROCEDURE — 76705 ECHO EXAM OF ABDOMEN: CPT | Mod: FOREIGN READ | Performed by: RADIOLOGY

## 2025-02-21 PROCEDURE — 2500000004 HC RX 250 GENERAL PHARMACY W/ HCPCS (ALT 636 FOR OP/ED): Performed by: PHYSICIAN ASSISTANT

## 2025-02-21 PROCEDURE — 96374 THER/PROPH/DIAG INJ IV PUSH: CPT

## 2025-02-21 PROCEDURE — 76705 ECHO EXAM OF ABDOMEN: CPT

## 2025-02-21 PROCEDURE — 36415 COLL VENOUS BLD VENIPUNCTURE: CPT | Performed by: PHYSICIAN ASSISTANT

## 2025-02-21 PROCEDURE — 85025 COMPLETE CBC W/AUTO DIFF WBC: CPT | Performed by: PHYSICIAN ASSISTANT

## 2025-02-21 PROCEDURE — 82248 BILIRUBIN DIRECT: CPT | Performed by: PHYSICIAN ASSISTANT

## 2025-02-21 PROCEDURE — 80053 COMPREHEN METABOLIC PANEL: CPT | Performed by: PHYSICIAN ASSISTANT

## 2025-02-21 PROCEDURE — 96375 TX/PRO/DX INJ NEW DRUG ADDON: CPT

## 2025-02-21 RX ORDER — KETOROLAC TROMETHAMINE 30 MG/ML
15 INJECTION, SOLUTION INTRAMUSCULAR; INTRAVENOUS ONCE
Status: COMPLETED | OUTPATIENT
Start: 2025-02-21 | End: 2025-02-21

## 2025-02-21 RX ORDER — ONDANSETRON HYDROCHLORIDE 2 MG/ML
4 INJECTION, SOLUTION INTRAVENOUS ONCE
Status: COMPLETED | OUTPATIENT
Start: 2025-02-21 | End: 2025-02-21

## 2025-02-21 RX ADMIN — KETOROLAC TROMETHAMINE 15 MG: 30 INJECTION, SOLUTION INTRAMUSCULAR at 09:41

## 2025-02-21 RX ADMIN — ONDANSETRON 4 MG: 2 INJECTION, SOLUTION INTRAMUSCULAR; INTRAVENOUS at 09:41

## 2025-02-21 ASSESSMENT — PAIN SCALES - GENERAL
PAINLEVEL_OUTOF10: 5 - MODERATE PAIN
PAINLEVEL_OUTOF10: 0 - NO PAIN
PAINLEVEL_OUTOF10: 5 - MODERATE PAIN

## 2025-02-21 ASSESSMENT — PAIN DESCRIPTION - LOCATION
LOCATION: ABDOMEN
LOCATION: ABDOMEN

## 2025-02-21 ASSESSMENT — PAIN - FUNCTIONAL ASSESSMENT
PAIN_FUNCTIONAL_ASSESSMENT: 0-10
PAIN_FUNCTIONAL_ASSESSMENT: 0-10

## 2025-02-21 ASSESSMENT — COLUMBIA-SUICIDE SEVERITY RATING SCALE - C-SSRS
6. HAVE YOU EVER DONE ANYTHING, STARTED TO DO ANYTHING, OR PREPARED TO DO ANYTHING TO END YOUR LIFE?: NO
1. IN THE PAST MONTH, HAVE YOU WISHED YOU WERE DEAD OR WISHED YOU COULD GO TO SLEEP AND NOT WAKE UP?: NO
2. HAVE YOU ACTUALLY HAD ANY THOUGHTS OF KILLING YOURSELF?: NO

## 2025-02-21 ASSESSMENT — PAIN DESCRIPTION - DESCRIPTORS: DESCRIPTORS: DULL;ACHING

## 2025-02-21 ASSESSMENT — PAIN DESCRIPTION - ORIENTATION
ORIENTATION: RIGHT
ORIENTATION: RIGHT;LOWER

## 2025-02-21 ASSESSMENT — PAIN DESCRIPTION - PAIN TYPE: TYPE: ACUTE PAIN

## 2025-02-21 ASSESSMENT — PAIN DESCRIPTION - PROGRESSION: CLINICAL_PROGRESSION: NOT CHANGED

## 2025-02-21 ASSESSMENT — PAIN DESCRIPTION - FREQUENCY: FREQUENCY: INTERMITTENT

## 2025-02-21 NOTE — ED TRIAGE NOTES
Pt to ed with c/o RLQ abd pain. Pt states it has been going on and off for two weeks. Denies urinary sx. Hx kidney stones.

## 2025-02-21 NOTE — DISCHARGE INSTRUCTIONS
Take motrin 400 mg every 6 hours as needed for gallbladder attacks.    Follow the diet modifications  See Dr. Funes's office next week  Return to the ER if your pain is uncontrolled, if you have uncontrolled vomiting or you develop a fever > 100.4

## 2025-02-21 NOTE — ED PROVIDER NOTES
HPI   Chief Complaint   Patient presents with    Abdominal Pain       HPI        Patient History   Past Medical History:   Diagnosis Date    Anxiety     Kidney stones      Past Surgical History:   Procedure Laterality Date     SECTION, LOW TRANSVERSE  10/28/2022     section    DILATION AND CURETTAGE OF UTERUS  10/31/2019    Dilation and curettage    WISDOM TOOTH EXTRACTION  2020    [place] tooth extraction     No family history on file.  Social History     Tobacco Use    Smoking status: Never    Smokeless tobacco: Never   Vaping Use    Vaping status: Never Used   Substance Use Topics    Alcohol use: Not Currently    Drug use: Never       Physical Exam   ED Triage Vitals   Temperature Heart Rate Respirations BP   25 0907 25 0907 25 0907 25 0907   36.2 °C (97.1 °F) (!) 103 18 (!) 149/95      Pulse Ox Temp src Heart Rate Source Patient Position   25 0907 -- 25 1100 25 1100   99 %  Monitor Sitting      BP Location FiO2 (%)     25 1100 --     Left arm        Physical Exam      ED Course & MDM   Diagnoses as of 25 1504   Biliary colic                 No data recorded                                 Medical Decision Making  Amount and/or Complexity of Data Reviewed  Discussion of management or test interpretation with external provider(s): Emergency Medicine Attending Attestation:     Diagnoses as of 25 1459  Biliary colic       This patient was seen by the advanced practice provider.  I have personally performed a substantive portion of the encounter.  I have seen and examined the patient; agree with the workup, evaluation, MDM, management and diagnosis.  The care plan has been discussed.      I personally saw the patient and made/approved the management plan and take responsibility for the patient management.    History: RUQ pain intermittently for 2 weeks.  Hasn't noticed worse with fried foods.  She ate turkey sub last night and had pain  afterwards.  She is breastfeeding.  No known h/o gallstones.  No vomiting or fever.    Exam: RUQ ttp.  No guarding or rebound.    I independently reviewed the ultrasound images and she has multiple gallstones without pericholecystic fluid or a  thickened gb wall.  No dilated  CBD.    MDM: Offered to hospitalize vs dc with close fu with gen surgery.  Patient opted to discharge home.  Bentyl is contraindication for lactating women so she will stick to Motrin.  I gave her Dr. William pham information for follow-up.  I also gave her dietary recommendations to minimize the biliary colic episodes.  Patient was happy with this plan and given her labs are within normal limits and she is afebrile and tolerating fluids I think this is reasonable to try to avoid surgery if possible.                I independently interpreted patient's EKG and agree with the above mentioned interpretation.      Berneice Raya MD          Procedure  Procedures     Berenice Raya MD  02/21/25 0008

## 2025-02-24 ENCOUNTER — APPOINTMENT (OUTPATIENT)
Dept: PRIMARY CARE | Facility: CLINIC | Age: 35
End: 2025-02-24
Payer: COMMERCIAL

## 2025-02-25 ENCOUNTER — APPOINTMENT (OUTPATIENT)
Dept: PRIMARY CARE | Facility: CLINIC | Age: 35
End: 2025-02-25
Payer: COMMERCIAL

## 2025-02-28 ENCOUNTER — APPOINTMENT (OUTPATIENT)
Dept: SURGERY | Facility: CLINIC | Age: 35
End: 2025-02-28
Payer: COMMERCIAL

## 2025-03-03 ENCOUNTER — APPOINTMENT (OUTPATIENT)
Dept: SURGERY | Facility: CLINIC | Age: 35
End: 2025-03-03
Payer: COMMERCIAL

## 2025-03-03 VITALS
BODY MASS INDEX: 32.77 KG/M2 | OXYGEN SATURATION: 98 % | DIASTOLIC BLOOD PRESSURE: 84 MMHG | HEIGHT: 68 IN | WEIGHT: 216.2 LBS | SYSTOLIC BLOOD PRESSURE: 141 MMHG | HEART RATE: 97 BPM

## 2025-03-03 DIAGNOSIS — K80.50 BILIARY COLIC: Primary | ICD-10-CM

## 2025-03-03 PROCEDURE — 1036F TOBACCO NON-USER: CPT | Performed by: SURGERY

## 2025-03-03 PROCEDURE — 3008F BODY MASS INDEX DOCD: CPT | Performed by: SURGERY

## 2025-03-03 PROCEDURE — 99244 OFF/OP CNSLTJ NEW/EST MOD 40: CPT | Performed by: SURGERY

## 2025-03-03 RX ORDER — CEFAZOLIN SODIUM 2 G/100ML
2 INJECTION, SOLUTION INTRAVENOUS ONCE
OUTPATIENT
Start: 2025-03-03 | End: 2025-03-03

## 2025-03-03 RX ORDER — HEPARIN SODIUM 5000 [USP'U]/ML
5000 INJECTION, SOLUTION INTRAVENOUS; SUBCUTANEOUS ONCE
OUTPATIENT
Start: 2025-03-03 | End: 2025-03-03

## 2025-03-03 ASSESSMENT — ENCOUNTER SYMPTOMS
PALPITATIONS: 0
FEVER: 0
VOMITING: 0
BLOOD IN STOOL: 0
DIARRHEA: 1
COUGH: 0
CONSTIPATION: 1
NAUSEA: 0
CHILLS: 0
SHORTNESS OF BREATH: 0
HEADACHES: 0
DIZZINESS: 0
ABDOMINAL PAIN: 1

## 2025-03-03 NOTE — PROGRESS NOTES
GENERAL SURGERY CLINIC NOTE    Amaya Todd   1990   [id number]     History Of Present Illness  Amaya Todd is a 34 y.o. female who presents to the office for evaluation of her gallbladder. Starting approximately 3 weeks ago, she began experiencing right upper quadrant pain without nausea/vomiting. She was evaluated in the ED and found to have cholelithiasis. She adjusted her diet to exclude dairy and red meat and the severity of the pain decreased but has not resolved. She does not know aggravating factors as she didn't experience symptoms prior to this year.    She has 4 children ages 6, 4, 2 and <1. She works from home and looks after the kids at the same time. She is breastfeeding her youngest and plans to until about 2 years of age. She does not have excess breastmilk frozen. Her mother and mother in law may be able to help during the day with childcare, but she is not sure.     Past Medical History  She has a past medical history of Anxiety, Depression, and Kidney stones.    Surgical History  She has a past surgical history that includes  section, low transverse (10/28/2022); [place] tooth extraction (2020); and Dilation and curettage of uterus (10/31/2019).  C section x4    Medications  Current Outpatient Medications on File Prior to Visit   Medication Sig Dispense Refill    acetaminophen (Tylenol) 325 mg tablet Take 2 tablets (650 mg) by mouth every 6 hours. 90 tablet 0    ibuprofen 600 mg tablet Take 1 tablet (600 mg) by mouth every 6 hours. 30 tablet 0    norethindrone (Micronor) 0.35 mg tablet Take 1 tablet (0.35 mg) over 28 days by mouth once daily. 28 tablet 11     No current facility-administered medications on file prior to visit.       Allergies  Patient has no known allergies.     Social History  She reports that she quit smoking about 8 years ago. Her smoking use included cigarettes. She started smoking about 10 years ago. She has a 0.7 pack-year smoking history. She has never  "used smokeless tobacco. She reports that she does not currently use alcohol. She reports that she does not use drugs.    Family History  Family History   Problem Relation Name Age of Onset    Alcohol abuse Mother Lien     Hypertension Mother Lien     Depression Mother Lien     Alcohol abuse Father Travis elaine     Cancer Maternal Grandfather Alan     Colon cancer Maternal Grandfather Alan     Breast cancer Maternal Grandmother Marilou     Cancer Maternal Grandmother Marilou     Diabetes Paternal Grandmother Mariella     Cancer Mother's Brother Lucho         Review of Systems   Constitutional:  Negative for chills and fever.   Respiratory:  Negative for cough and shortness of breath.    Cardiovascular:  Negative for chest pain and palpitations.   Gastrointestinal:  Positive for abdominal pain, constipation and diarrhea. Negative for blood in stool, nausea and vomiting.        Pt has been experiencing constipation/diarrhea since giving birth last year   Neurological:  Negative for dizziness and headaches.   All other systems reviewed and are negative.      Last Recorded Vitals  Blood pressure 141/84, pulse 97, height 1.727 m (5' 8\"), weight 98.1 kg (216 lb 3.2 oz), SpO2 98%, currently breastfeeding.     Physical Exam  Constitutional:       General: She is not in acute distress.     Appearance: Normal appearance. She is not ill-appearing.   HENT:      Head: Normocephalic and atraumatic.   Cardiovascular:      Rate and Rhythm: Normal rate and regular rhythm.   Pulmonary:      Effort: Pulmonary effort is normal. No respiratory distress.      Breath sounds: Normal breath sounds.   Abdominal:      General: There is no distension.      Palpations: Abdomen is soft.      Tenderness: There is no abdominal tenderness. There is no guarding.   Musculoskeletal:         General: No swelling.   Skin:     General: Skin is warm and dry.   Neurological:      Mental Status: She is alert and oriented to person, place, " and time. Mental status is at baseline.   Psychiatric:         Mood and Affect: Mood normal.         Behavior: Behavior normal.          Relevant Results    US right upper quadrant  Two non specific hepatic lesions largest measuring 2.4 x 1.6 x 1.8 cm.  Assuming no prior history of malignancy and no prior history of liver disease, this likely reflects a benign hemangioma. Cholelithiasis.  No gallbladder wall thickening or biliary dilatation is appreciated Signed by Cr Julien MD      Assessment and Plan  34 y.o. female with cholelithiasis and symptoms consistent with biliary colic. I discussed undergoing elective cholecystectomy and she was agreeable. The risks and benefits of the procedure were discussed. Risks include allergic reactions, heart or lung complications, bleeding, infection, injury to surrounding tissues (bowel, liver), bile duct injury, bile leak, hernia formation at incisions, additional planned or unplanned procedures required, nonresolution of symptoms, and pain. The patient expressed understanding and provided informed consent for surgery.     OR 3/25/25 for laparoscopic cholecystectomy, possible open. Urine hcg the day of surgery. I asked her to work on arranging childcare during the day for ideally 3 weeks postop and 4 if possible. She expressed her understanding and all questions were answered.     Autumn Jose MD, FACS  General Surgery

## 2025-03-03 NOTE — H&P (VIEW-ONLY)
GENERAL SURGERY CLINIC NOTE    Amaya Todd   1990   [id number]     History Of Present Illness  Amaya Todd is a 34 y.o. female who presents to the office for evaluation of her gallbladder. Starting approximately 3 weeks ago, she began experiencing right upper quadrant pain without nausea/vomiting. She was evaluated in the ED and found to have cholelithiasis. She adjusted her diet to exclude dairy and red meat and the severity of the pain decreased but has not resolved. She does not know aggravating factors as she didn't experience symptoms prior to this year.    She has 4 children ages 6, 4, 2 and <1. She works from home and looks after the kids at the same time. She is breastfeeding her youngest and plans to until about 2 years of age. She does not have excess breastmilk frozen. Her mother and mother in law may be able to help during the day with childcare, but she is not sure.     Past Medical History  She has a past medical history of Anxiety, Depression, and Kidney stones.    Surgical History  She has a past surgical history that includes  section, low transverse (10/28/2022); [place] tooth extraction (2020); and Dilation and curettage of uterus (10/31/2019).  C section x4    Medications  Current Outpatient Medications on File Prior to Visit   Medication Sig Dispense Refill    acetaminophen (Tylenol) 325 mg tablet Take 2 tablets (650 mg) by mouth every 6 hours. 90 tablet 0    ibuprofen 600 mg tablet Take 1 tablet (600 mg) by mouth every 6 hours. 30 tablet 0    norethindrone (Micronor) 0.35 mg tablet Take 1 tablet (0.35 mg) over 28 days by mouth once daily. 28 tablet 11     No current facility-administered medications on file prior to visit.       Allergies  Patient has no known allergies.     Social History  She reports that she quit smoking about 8 years ago. Her smoking use included cigarettes. She started smoking about 10 years ago. She has a 0.7 pack-year smoking history. She has never  "used smokeless tobacco. She reports that she does not currently use alcohol. She reports that she does not use drugs.    Family History  Family History   Problem Relation Name Age of Onset    Alcohol abuse Mother Lien     Hypertension Mother Lien     Depression Mother Lien     Alcohol abuse Father Travis elaine     Cancer Maternal Grandfather Alan     Colon cancer Maternal Grandfather Alan     Breast cancer Maternal Grandmother Marilou     Cancer Maternal Grandmother Marilou     Diabetes Paternal Grandmother Mariella     Cancer Mother's Brother Lucho         Review of Systems   Constitutional:  Negative for chills and fever.   Respiratory:  Negative for cough and shortness of breath.    Cardiovascular:  Negative for chest pain and palpitations.   Gastrointestinal:  Positive for abdominal pain, constipation and diarrhea. Negative for blood in stool, nausea and vomiting.        Pt has been experiencing constipation/diarrhea since giving birth last year   Neurological:  Negative for dizziness and headaches.   All other systems reviewed and are negative.      Last Recorded Vitals  Blood pressure 141/84, pulse 97, height 1.727 m (5' 8\"), weight 98.1 kg (216 lb 3.2 oz), SpO2 98%, currently breastfeeding.     Physical Exam  Constitutional:       General: She is not in acute distress.     Appearance: Normal appearance. She is not ill-appearing.   HENT:      Head: Normocephalic and atraumatic.   Cardiovascular:      Rate and Rhythm: Normal rate and regular rhythm.   Pulmonary:      Effort: Pulmonary effort is normal. No respiratory distress.      Breath sounds: Normal breath sounds.   Abdominal:      General: There is no distension.      Palpations: Abdomen is soft.      Tenderness: There is no abdominal tenderness. There is no guarding.   Musculoskeletal:         General: No swelling.   Skin:     General: Skin is warm and dry.   Neurological:      Mental Status: She is alert and oriented to person, place, " and time. Mental status is at baseline.   Psychiatric:         Mood and Affect: Mood normal.         Behavior: Behavior normal.          Relevant Results    US right upper quadrant  Two non specific hepatic lesions largest measuring 2.4 x 1.6 x 1.8 cm.  Assuming no prior history of malignancy and no prior history of liver disease, this likely reflects a benign hemangioma. Cholelithiasis.  No gallbladder wall thickening or biliary dilatation is appreciated Signed by Cr Julien MD      Assessment and Plan  34 y.o. female with cholelithiasis and symptoms consistent with biliary colic. I discussed undergoing elective cholecystectomy and she was agreeable. The risks and benefits of the procedure were discussed. Risks include allergic reactions, heart or lung complications, bleeding, infection, injury to surrounding tissues (bowel, liver), bile duct injury, bile leak, hernia formation at incisions, additional planned or unplanned procedures required, nonresolution of symptoms, and pain. The patient expressed understanding and provided informed consent for surgery.     OR 3/25/25 for laparoscopic cholecystectomy, possible open. Urine hcg the day of surgery. I asked her to work on arranging childcare during the day for ideally 3 weeks postop and 4 if possible. She expressed her understanding and all questions were answered.     Autumn Jose MD, FACS  General Surgery

## 2025-03-04 ENCOUNTER — APPOINTMENT (OUTPATIENT)
Dept: PRIMARY CARE | Facility: CLINIC | Age: 35
End: 2025-03-04
Payer: COMMERCIAL

## 2025-03-10 ENCOUNTER — ANESTHESIA EVENT (OUTPATIENT)
Dept: OPERATING ROOM | Facility: HOSPITAL | Age: 35
End: 2025-03-10
Payer: COMMERCIAL

## 2025-03-10 ENCOUNTER — APPOINTMENT (OUTPATIENT)
Dept: UROLOGY | Facility: CLINIC | Age: 35
End: 2025-03-10
Payer: COMMERCIAL

## 2025-03-13 ENCOUNTER — APPOINTMENT (OUTPATIENT)
Dept: SURGERY | Facility: CLINIC | Age: 35
End: 2025-03-13
Payer: COMMERCIAL

## 2025-03-14 ENCOUNTER — APPOINTMENT (OUTPATIENT)
Dept: SURGERY | Facility: CLINIC | Age: 35
End: 2025-03-14
Payer: COMMERCIAL

## 2025-03-19 ENCOUNTER — APPOINTMENT (OUTPATIENT)
Dept: PRIMARY CARE | Facility: CLINIC | Age: 35
End: 2025-03-19
Payer: COMMERCIAL

## 2025-03-20 ENCOUNTER — APPOINTMENT (OUTPATIENT)
Dept: UROLOGY | Facility: CLINIC | Age: 35
End: 2025-03-20
Payer: COMMERCIAL

## 2025-03-21 ENCOUNTER — CLINICAL SUPPORT (OUTPATIENT)
Dept: PREADMISSION TESTING | Facility: HOSPITAL | Age: 35
End: 2025-03-21
Payer: COMMERCIAL

## 2025-03-21 NOTE — PREPROCEDURE INSTRUCTIONS
No outpatient medications have been marked as taking for the 3/21/25 encounter (Clinical Support) with [facility].                       NPO Instructions:    Nothing to eat or drink after midnight  Hydrate well the day before  No medications morning of surgery  Tylenol only prior to surgery    Additional Instructions:     Wear  comfortable loose fitting clothing  Do not use moisturizers, creams, lotions or perfume  All jewelry and valuables should be left at home  Shower with hibiclens night before and morning of surgery, below the neck line and away from private area  Call [phone] with any questions    Deep Breathing Exercises after surgery:   Breathe deeply using your lungs as fully as possible to move   secretions and clear lungs more easily.  Do a cycle of five deep breaths every hour.      Start by placing your hands on your ribs and take a deep breath in through your nose, expanding your lower chest.  You should feel your ribs push against your hands.  Breathe out slowly through your mouth until all the air is gone.    For every other breath, hold your breath for three seconds.  This will help keep the lungs fully open.     Coughing : Coughing is necessary to clear secretions that may accumulate in your lungs.  This should be done after breathing exercises.    If lying down, bend your knees and support you incision with a pillow or your hands to make it more comfortable.    If sitting, support your incision, lean forward and keep your feet on the floor.  After your five deep breaths, breathe in and cough out sharply.  Repeat this cycle twice or for as long as you have secretions to clear.  ( You will not put your incision at risk by coughing.)    Leg Exercises:  Leg exercises are important to maintain good blood circulation in your legs, maintain muscle strength and prevent joint stiffness.  Do each exercise for 5 repetitions every hour while awake for the first few days or until you are up and  walking around.         A. Pump your feet up and down at the ankles        B. With your legs straight, make circles with your feet.        C. Bend and straighten your knees by sliding your heels up and down the bed.         D. With your legs straight tighten the muscle above your knee and push the back of your knee down             Into the bed.  Hold for five seconds and then relax.  Alternate legs.

## 2025-03-25 ENCOUNTER — ANESTHESIA (OUTPATIENT)
Dept: OPERATING ROOM | Facility: HOSPITAL | Age: 35
End: 2025-03-25
Payer: COMMERCIAL

## 2025-03-25 ENCOUNTER — HOSPITAL ENCOUNTER (OUTPATIENT)
Facility: HOSPITAL | Age: 35
Setting detail: OUTPATIENT SURGERY
Discharge: HOME | End: 2025-03-25
Attending: SURGERY | Admitting: SURGERY
Payer: COMMERCIAL

## 2025-03-25 ENCOUNTER — PHARMACY VISIT (OUTPATIENT)
Dept: PHARMACY | Facility: CLINIC | Age: 35
End: 2025-03-25
Payer: MEDICAID

## 2025-03-25 ENCOUNTER — TELEPHONE (OUTPATIENT)
Dept: OBSTETRICS AND GYNECOLOGY | Facility: CLINIC | Age: 35
End: 2025-03-25

## 2025-03-25 VITALS
HEART RATE: 81 BPM | RESPIRATION RATE: 12 BRPM | SYSTOLIC BLOOD PRESSURE: 117 MMHG | OXYGEN SATURATION: 100 % | DIASTOLIC BLOOD PRESSURE: 70 MMHG | TEMPERATURE: 98 F

## 2025-03-25 DIAGNOSIS — K80.50 BILIARY COLIC: Primary | ICD-10-CM

## 2025-03-25 LAB — PREGNANCY TEST URINE, POC: NEGATIVE

## 2025-03-25 PROCEDURE — 7100000002 HC RECOVERY ROOM TIME - EACH INCREMENTAL 1 MINUTE: Performed by: SURGERY

## 2025-03-25 PROCEDURE — 2500000005 HC RX 250 GENERAL PHARMACY W/O HCPCS: Performed by: NURSE ANESTHETIST, CERTIFIED REGISTERED

## 2025-03-25 PROCEDURE — 2720000007 HC OR 272 NO HCPCS: Performed by: SURGERY

## 2025-03-25 PROCEDURE — 96372 THER/PROPH/DIAG INJ SC/IM: CPT | Performed by: NURSE ANESTHETIST, CERTIFIED REGISTERED

## 2025-03-25 PROCEDURE — 7100000010 HC PHASE TWO TIME - EACH INCREMENTAL 1 MINUTE: Performed by: SURGERY

## 2025-03-25 PROCEDURE — 3700000002 HC GENERAL ANESTHESIA TIME - EACH INCREMENTAL 1 MINUTE: Performed by: SURGERY

## 2025-03-25 PROCEDURE — 7100000001 HC RECOVERY ROOM TIME - INITIAL BASE CHARGE: Performed by: SURGERY

## 2025-03-25 PROCEDURE — 2500000004 HC RX 250 GENERAL PHARMACY W/ HCPCS (ALT 636 FOR OP/ED): Performed by: SURGERY

## 2025-03-25 PROCEDURE — 81025 URINE PREGNANCY TEST: CPT | Performed by: SURGERY

## 2025-03-25 PROCEDURE — 96372 THER/PROPH/DIAG INJ SC/IM: CPT | Performed by: SURGERY

## 2025-03-25 PROCEDURE — 7100000009 HC PHASE TWO TIME - INITIAL BASE CHARGE: Performed by: SURGERY

## 2025-03-25 PROCEDURE — 2500000001 HC RX 250 WO HCPCS SELF ADMINISTERED DRUGS (ALT 637 FOR MEDICARE OP): Performed by: ANESTHESIOLOGY

## 2025-03-25 PROCEDURE — 3600000004 HC OR TIME - INITIAL BASE CHARGE - PROCEDURE LEVEL FOUR: Performed by: SURGERY

## 2025-03-25 PROCEDURE — 88304 TISSUE EXAM BY PATHOLOGIST: CPT | Mod: TC,PORLAB | Performed by: SURGERY

## 2025-03-25 PROCEDURE — 2780000003 HC OR 278 NO HCPCS: Performed by: SURGERY

## 2025-03-25 PROCEDURE — 3700000001 HC GENERAL ANESTHESIA TIME - INITIAL BASE CHARGE: Performed by: SURGERY

## 2025-03-25 PROCEDURE — 47562 LAPAROSCOPIC CHOLECYSTECTOMY: CPT | Performed by: SURGERY

## 2025-03-25 PROCEDURE — 2500000004 HC RX 250 GENERAL PHARMACY W/ HCPCS (ALT 636 FOR OP/ED): Mod: JZ | Performed by: ANESTHESIOLOGY

## 2025-03-25 PROCEDURE — 2500000004 HC RX 250 GENERAL PHARMACY W/ HCPCS (ALT 636 FOR OP/ED): Mod: JZ | Performed by: NURSE ANESTHETIST, CERTIFIED REGISTERED

## 2025-03-25 PROCEDURE — 3600000009 HC OR TIME - EACH INCREMENTAL 1 MINUTE - PROCEDURE LEVEL FOUR: Performed by: SURGERY

## 2025-03-25 PROCEDURE — RXMED WILLOW AMBULATORY MEDICATION CHARGE

## 2025-03-25 RX ORDER — DIPHENHYDRAMINE HYDROCHLORIDE 50 MG/ML
12.5 INJECTION, SOLUTION INTRAMUSCULAR; INTRAVENOUS ONCE AS NEEDED
Status: DISCONTINUED | OUTPATIENT
Start: 2025-03-25 | End: 2025-03-25 | Stop reason: HOSPADM

## 2025-03-25 RX ORDER — MORPHINE SULFATE 2 MG/ML
2 INJECTION, SOLUTION INTRAMUSCULAR; INTRAVENOUS EVERY 5 MIN PRN
Status: DISCONTINUED | OUTPATIENT
Start: 2025-03-25 | End: 2025-03-25 | Stop reason: HOSPADM

## 2025-03-25 RX ORDER — PROPOFOL 10 MG/ML
INJECTION, EMULSION INTRAVENOUS AS NEEDED
Status: DISCONTINUED | OUTPATIENT
Start: 2025-03-25 | End: 2025-03-25

## 2025-03-25 RX ORDER — SODIUM CHLORIDE, SODIUM LACTATE, POTASSIUM CHLORIDE, CALCIUM CHLORIDE 600; 310; 30; 20 MG/100ML; MG/100ML; MG/100ML; MG/100ML
100 INJECTION, SOLUTION INTRAVENOUS CONTINUOUS
Status: DISCONTINUED | OUTPATIENT
Start: 2025-03-25 | End: 2025-03-25 | Stop reason: HOSPADM

## 2025-03-25 RX ORDER — MEPERIDINE HYDROCHLORIDE 25 MG/ML
12.5 INJECTION INTRAMUSCULAR; INTRAVENOUS; SUBCUTANEOUS EVERY 10 MIN PRN
Status: DISCONTINUED | OUTPATIENT
Start: 2025-03-25 | End: 2025-03-25 | Stop reason: HOSPADM

## 2025-03-25 RX ORDER — NORETHINDRONE AND ETHINYL ESTRADIOL 0.5-0.035
KIT ORAL AS NEEDED
Status: DISCONTINUED | OUTPATIENT
Start: 2025-03-25 | End: 2025-03-25

## 2025-03-25 RX ORDER — METOCLOPRAMIDE HYDROCHLORIDE 5 MG/ML
INJECTION INTRAMUSCULAR; INTRAVENOUS AS NEEDED
Status: DISCONTINUED | OUTPATIENT
Start: 2025-03-25 | End: 2025-03-25

## 2025-03-25 RX ORDER — KETOROLAC TROMETHAMINE 30 MG/ML
INJECTION, SOLUTION INTRAMUSCULAR; INTRAVENOUS AS NEEDED
Status: DISCONTINUED | OUTPATIENT
Start: 2025-03-25 | End: 2025-03-25

## 2025-03-25 RX ORDER — BUPIVACAINE HYDROCHLORIDE 2.5 MG/ML
INJECTION, SOLUTION INFILTRATION; PERINEURAL AS NEEDED
Status: DISCONTINUED | OUTPATIENT
Start: 2025-03-25 | End: 2025-03-25 | Stop reason: HOSPADM

## 2025-03-25 RX ORDER — MIDAZOLAM HYDROCHLORIDE 1 MG/ML
INJECTION, SOLUTION INTRAMUSCULAR; INTRAVENOUS AS NEEDED
Status: DISCONTINUED | OUTPATIENT
Start: 2025-03-25 | End: 2025-03-25

## 2025-03-25 RX ORDER — ONDANSETRON HYDROCHLORIDE 2 MG/ML
4 INJECTION, SOLUTION INTRAVENOUS ONCE AS NEEDED
Status: DISCONTINUED | OUTPATIENT
Start: 2025-03-25 | End: 2025-03-25 | Stop reason: HOSPADM

## 2025-03-25 RX ORDER — FENTANYL CITRATE 50 UG/ML
INJECTION, SOLUTION INTRAMUSCULAR; INTRAVENOUS AS NEEDED
Status: DISCONTINUED | OUTPATIENT
Start: 2025-03-25 | End: 2025-03-25

## 2025-03-25 RX ORDER — LABETALOL HYDROCHLORIDE 5 MG/ML
5 INJECTION, SOLUTION INTRAVENOUS ONCE AS NEEDED
Status: DISCONTINUED | OUTPATIENT
Start: 2025-03-25 | End: 2025-03-25 | Stop reason: HOSPADM

## 2025-03-25 RX ORDER — LIDOCAINE HYDROCHLORIDE 10 MG/ML
0.1 INJECTION, SOLUTION EPIDURAL; INFILTRATION; INTRACAUDAL; PERINEURAL ONCE
Status: DISCONTINUED | OUTPATIENT
Start: 2025-03-25 | End: 2025-03-25 | Stop reason: HOSPADM

## 2025-03-25 RX ORDER — OXYCODONE AND ACETAMINOPHEN 5; 325 MG/1; MG/1
1 TABLET ORAL EVERY 4 HOURS PRN
Status: DISCONTINUED | OUTPATIENT
Start: 2025-03-25 | End: 2025-03-25 | Stop reason: HOSPADM

## 2025-03-25 RX ORDER — FAMOTIDINE 10 MG/ML
20 INJECTION, SOLUTION INTRAVENOUS ONCE
Status: COMPLETED | OUTPATIENT
Start: 2025-03-25 | End: 2025-03-25

## 2025-03-25 RX ORDER — ROCURONIUM BROMIDE 50 MG/5 ML
SYRINGE (ML) INTRAVENOUS AS NEEDED
Status: DISCONTINUED | OUTPATIENT
Start: 2025-03-25 | End: 2025-03-25

## 2025-03-25 RX ORDER — HYDRALAZINE HYDROCHLORIDE 20 MG/ML
5 INJECTION INTRAMUSCULAR; INTRAVENOUS EVERY 30 MIN PRN
Status: DISCONTINUED | OUTPATIENT
Start: 2025-03-25 | End: 2025-03-25 | Stop reason: HOSPADM

## 2025-03-25 RX ORDER — CEFAZOLIN SODIUM 2 G/50ML
2 SOLUTION INTRAVENOUS ONCE
Status: COMPLETED | OUTPATIENT
Start: 2025-03-25 | End: 2025-03-25

## 2025-03-25 RX ORDER — OXYCODONE AND ACETAMINOPHEN 5; 325 MG/1; MG/1
1 TABLET ORAL EVERY 6 HOURS PRN
Qty: 15 TABLET | Refills: 0 | Status: SHIPPED | OUTPATIENT
Start: 2025-03-25

## 2025-03-25 RX ORDER — DROPERIDOL 2.5 MG/ML
0.62 INJECTION, SOLUTION INTRAMUSCULAR; INTRAVENOUS ONCE AS NEEDED
Status: DISCONTINUED | OUTPATIENT
Start: 2025-03-25 | End: 2025-03-25 | Stop reason: HOSPADM

## 2025-03-25 RX ORDER — POLYETHYLENE GLYCOL 3350 17 G/17G
17 POWDER, FOR SOLUTION ORAL DAILY
Qty: 238 G | Refills: 0 | Status: SHIPPED | OUTPATIENT
Start: 2025-03-25 | End: 2025-05-24

## 2025-03-25 RX ORDER — ALBUTEROL SULFATE 0.83 MG/ML
2.5 SOLUTION RESPIRATORY (INHALATION) ONCE AS NEEDED
Status: DISCONTINUED | OUTPATIENT
Start: 2025-03-25 | End: 2025-03-25 | Stop reason: HOSPADM

## 2025-03-25 RX ORDER — HEPARIN SODIUM 5000 [USP'U]/ML
5000 INJECTION, SOLUTION INTRAVENOUS; SUBCUTANEOUS ONCE
Status: COMPLETED | OUTPATIENT
Start: 2025-03-25 | End: 2025-03-25

## 2025-03-25 RX ORDER — ONDANSETRON HYDROCHLORIDE 2 MG/ML
INJECTION, SOLUTION INTRAVENOUS AS NEEDED
Status: DISCONTINUED | OUTPATIENT
Start: 2025-03-25 | End: 2025-03-25

## 2025-03-25 RX ADMIN — SUGAMMADEX 200 MG: 100 INJECTION, SOLUTION INTRAVENOUS at 11:33

## 2025-03-25 RX ADMIN — KETOROLAC TROMETHAMINE 30 MG: 30 INJECTION, SOLUTION INTRAMUSCULAR at 11:18

## 2025-03-25 RX ADMIN — MIDAZOLAM 2 MG: 1 INJECTION INTRAMUSCULAR; INTRAVENOUS at 10:12

## 2025-03-25 RX ADMIN — Medication 50 MG: at 10:22

## 2025-03-25 RX ADMIN — OXYCODONE HYDROCHLORIDE AND ACETAMINOPHEN 1 TABLET: 5; 325 TABLET ORAL at 12:26

## 2025-03-25 RX ADMIN — FAMOTIDINE 20 MG: 10 INJECTION, SOLUTION INTRAVENOUS at 09:44

## 2025-03-25 RX ADMIN — DEXAMETHASONE SODIUM PHOSPHATE 4 MG: 4 INJECTION, SOLUTION INTRAMUSCULAR; INTRAVENOUS at 10:20

## 2025-03-25 RX ADMIN — FENTANYL CITRATE 100 MCG: 50 INJECTION INTRAMUSCULAR; INTRAVENOUS at 10:22

## 2025-03-25 RX ADMIN — ONDANSETRON 4 MG: 2 INJECTION INTRAMUSCULAR; INTRAVENOUS at 11:20

## 2025-03-25 RX ADMIN — Medication 10 MG: at 10:57

## 2025-03-25 RX ADMIN — EPHEDRINE SULFATE 5 MG: 50 INJECTION, SOLUTION INTRAVENOUS at 10:50

## 2025-03-25 RX ADMIN — SODIUM CHLORIDE, POTASSIUM CHLORIDE, SODIUM LACTATE AND CALCIUM CHLORIDE: 600; 310; 30; 20 INJECTION, SOLUTION INTRAVENOUS at 10:08

## 2025-03-25 RX ADMIN — HEPARIN SODIUM 5000 UNITS: 5000 INJECTION, SOLUTION INTRAVENOUS; SUBCUTANEOUS at 09:44

## 2025-03-25 RX ADMIN — HYDROMORPHONE HYDROCHLORIDE 0.5 MG: 0.5 INJECTION, SOLUTION INTRAMUSCULAR; INTRAVENOUS; SUBCUTANEOUS at 12:06

## 2025-03-25 RX ADMIN — HYDROMORPHONE HYDROCHLORIDE 0.5 MG: 0.5 INJECTION, SOLUTION INTRAMUSCULAR; INTRAVENOUS; SUBCUTANEOUS at 11:46

## 2025-03-25 RX ADMIN — METOCLOPRAMIDE 5 MG: 5 INJECTION, SOLUTION INTRAMUSCULAR; INTRAVENOUS at 10:18

## 2025-03-25 RX ADMIN — PROPOFOL 200 MG: 10 INJECTION, EMULSION INTRAVENOUS at 10:22

## 2025-03-25 RX ADMIN — METOCLOPRAMIDE 5 MG: 5 INJECTION, SOLUTION INTRAMUSCULAR; INTRAVENOUS at 10:13

## 2025-03-25 RX ADMIN — DEXAMETHASONE SODIUM PHOSPHATE 4 MG: 4 INJECTION, SOLUTION INTRAMUSCULAR; INTRAVENOUS at 10:16

## 2025-03-25 RX ADMIN — CEFAZOLIN SODIUM 2 G: 2 SOLUTION INTRAVENOUS at 10:13

## 2025-03-25 SDOH — HEALTH STABILITY: MENTAL HEALTH: CURRENT SMOKER: 0

## 2025-03-25 ASSESSMENT — PAIN SCALES - GENERAL
PAIN_LEVEL: 5
PAINLEVEL_OUTOF10: 5 - MODERATE PAIN
PAINLEVEL_OUTOF10: 7
PAINLEVEL_OUTOF10: 8
PAINLEVEL_OUTOF10: 8
PAINLEVEL_OUTOF10: 5 - MODERATE PAIN
PAINLEVEL_OUTOF10: 5 - MODERATE PAIN
PAINLEVEL_OUTOF10: 8
PAINLEVEL_OUTOF10: 0 - NO PAIN
PAINLEVEL_OUTOF10: 7
PAINLEVEL_OUTOF10: 7

## 2025-03-25 ASSESSMENT — PAIN - FUNCTIONAL ASSESSMENT
PAIN_FUNCTIONAL_ASSESSMENT: 0-10

## 2025-03-25 NOTE — OP NOTE
Laparoscopic cholecystectomy, lysis of adhesions Operative Note     Date: 3/25/2025  OR Location: [facility]    Name: Amaya Todd, : 1990, Age: 34 y.o., MRN: [id number], Sex: female    Diagnosis  Pre-op Diagnosis      * Biliary colic [K80.50] Post-op Diagnosis     * Biliary colic [K80.50]  Chronic cholecystitis     Procedures  Laparoscopic cholecystectomy, possible open  [address] - [place] LAPAROSCOPY SURG CHOLECYSTECTOMY  Lysis of adhseions    Surgeons      * Autumn Jose - Primary    Resident/Fellow/Other Assistant:  Surgeons and Role:  * No surgeons found with a matching role *  LILIANA Montemayor    Staff:   Circulator: Yanna Jones Person: Shayna  Surgical Assistant: Toni Rodgers Circulator: Marisabel    Anesthesia Staff: CRNA: CLARI Mckinney-CRNA    Procedure Summary  Anesthesia: General  ASA: II  Estimated Blood Loss: 5mL  Intra-op Medications:   Administrations occurring from 1015 to 1245 on 25:   Medication Name Total Dose   BUPivacaine HCl (Marcaine) 0.25 % (2.5 mg/mL) injection 30 mL   dexAMETHasone (Decadron) injection 4 mg/mL 8 mg   ePHEDrine injection 5 mg   fentaNYL (Sublimaze) injection 50 mcg/mL 100 mcg   ketorolac (Toradol) IM injection 60 mg/2 mL 30 mg   metoclopramide (Reglan) injection 5 mg   ondansetron (Zofran) 2 mg/mL injection 4 mg   propofol (Diprivan) injection 10 mg/mL 200 mg   rocuronium (Zemuron) 50 mg/5 mL prefilled syringe 60 mg              Anesthesia Record               Intraprocedure I/O Totals          Intake    ceFAZolin (Ancef) 2 g in dextrose (iso) IV 50 mL 50.00 mL    Total Intake 50 mL          Specimen:   ID Type Source Tests Collected by Time   1 : GALLBLADDER Tissue GALLBLADDER CHOLECYSTECTOMY SURGICAL PATHOLOGY EXAM Autumn Jose MD 3/25/2025 1113      Findings: chronically thickened gallbladder    Indications: Amaya Todd is an 34 y.o. female who is having surgery for Biliary colic [K80.50].     The patient was seen in the preoperative area.  The risks, benefits, complications, treatment options, non-operative alternatives, expected recovery and outcomes were discussed with the patient. The possibilities of reaction to medication, pulmonary aspiration, injury to surrounding structures, bleeding, recurrent infection, the need for additional procedures, failure to diagnose a condition, and creating a complication requiring transfusion or operation were discussed with the patient. The patient concurred with the proposed plan, giving informed consent.  The site of surgery was properly noted/marked if necessary per policy. The patient has been actively warmed in preoperative area. Preoperative antibiotics have been ordered and given within 1 hours of incision. Venous thrombosis prophylaxis have been ordered including bilateral sequential compression devices and chemical prophylaxis    Procedure Details:   The patient was taken to the operating room and underwent general anesthesia. Sequential compression devices were placed. Subcutaneous heparin and appropriate antibiotics were administered. The left arm was tucked at the side and all pressure points were padded. The abdomen was prepped and draped in the usual sterile fashion. A time-out was performed with all parties present.     In the left upper quadrant at Franks’s point, a 5mm incision was made and a Veress needle was placed into the intra-abdominal cavity. There was no fluid on aspiration and the drop test was reassuring. The initial pressure was 6 mmHg and the abdomen was insufflated to 15mmHg without hemodynamic changes. The Veress needle was removed and a 5mm port was placed through the incision in an Optiview fashion. On initial inspection of the intra-abdominal cavity, there were no injuries noted from Veress needle or port placement. In the periumbilical midline, an incision was made and a 12mm bladed trocar was placed into the abdominal cavity under direct visualization. In the right upper  quadrant at the mid-clavicular line a few centimeters below the costal margin, an incision was made and a 5mm port was placed under direct visualization. In the right lateral abdomen at the level of the umbilicus, an incision was made and a 5mm port was placed under direct visualization.     The fundus of the gallbladder was elevated. The gallbladder appeared chronically inflamed, consistent with chronic cholecystitis. There were omental adhesions to the body of the gallbladder that were carefully lysed. The infundibulum was grasped to provide exposure. The peritoneum was opened anteriorly and posteriorly using electrocautery. Blunt dissection was used to carefully dissect the triangle of Calot. The cystic duct and cystic artery were dissected free and a critical view was obtained. There were two structures visualized entering the gallbladder and there was visible liver between the duct and the artery as well as between the artery and the cystic plate. A 5mm Hem-o-marie clip applier was used to secure the duct with 2 clips proximally and 1 clip distally. The artery was secured using 2 clips proximately and 1 clip distally. Once the structures were transected, the gallbladder was carefully dissected off the cystic plate, taking care to maintain hemostasis. There was a small volume of bile spilled from the gallbladder during the dissection. The gallbladder was then removed from the abdominal cavity using an Endocatch bag through the periumbilical incision. The right upper quadrant was copiously irrigated and inspected. There was no bile or bleeding noted. The clips were visualized and appropriately secured the cystic duct and artery.     The fascia of the periumbilical port site was closed using 0 Vicryl in a figure-of-8 fashion and a suture passer. The abdomen was desufflated and the ports were removed. Local anesthesia was administered using 0.25% Marcaine. The incisions were closed with 4-0 Vicryl and sealed with  Liquiband.     The patient was extubated and taken to recovery in stable condition. All needle, sponge and instrument counts were correct at the end of the case. I was present for the entire case.     Complications:  None; patient tolerated the procedure well.    Disposition: PACU - hemodynamically stable.  Condition: stable     Task Performed by RNFA or Surgical Assistant:  Retraction, skin closure    Attending Attestation: I was present and scrubbed for the entire procedure.    Autumn Jose  Phone Number: [phone]

## 2025-03-25 NOTE — DISCHARGE INSTRUCTIONS
Discharge Instructions    Incisions  Your incisions are closed with skin glue. Do not remove the glue - it will fall off on its own in 1-2 weeks.  You may take a shower or bath starting this evening.   Keep the incisions clean and dry.   You may (but do not have to) cover the incisions with gauze and tape.     Pain  Do not drive while taking stronger pain medications (Tramadol, Percocet, Norco, Oxycodone, etc).   You may drive once you feel comfortable without stronger pain medications and can drive without any significant distractions related to your pain or surgical sites.  You may alternate acetaminophen and ibuprofen for pain control, as long as you are able to take either medication.     Diet  Resume your normal diet. Your appetite may not fully return immediately.   Please drink plenty of fluids to maintain hydration.    Physical Activity  Limit physical activity that would involve stretching the incision(s) for 2 weeks.  No lifting over 10-15 lb or strenuous physical activity for at least 3 weeks (ideally 4) after surgery. If there is pain with increased physical effort, please resume light duty restrictions.   If you need FMLA, short-term disability, or other paperwork completed, please provide the paperwork or instructions to the office in person or fax to [phone].    Follow Up  Follow up with Dr. Autumn Jose in the office per the instructions above. Please call the office to follow up sooner if there are concerns you would like to discuss.    Call Provider  If you are experiencing fever (100.4F or higher).  If you are experiencing significantly worsening pain, nausea or vomiting.  If the incision(s) appear reddened, swollen or are draining.  If you have very loose or watery bowel movements.  If you have any new concerning symptoms.

## 2025-03-25 NOTE — NURSING NOTE
1140: Patient to bay with anesthesia and surgical team present. Handoff report and plan of care reviewed, all questions answered. VSS.    1146: 0.5mg dialudid administered per given orders, allergies verified prior to administration    1155: Patient tolerating sips of water at this time    1206: 0.5mg dialudid administered per given orders, allergies verified prior to administration    1216: Patient tolerating crackers at this time    1226: 5-325mg Percocet administered per given orders, allergies verified prior to administration    1231: Family updated via text message    1244: Patient meets phase one discharge criteria    1245: Patient in phase two at this time    1246: Family at bedside. Discharge instructions reviewed with patient and family, no further questions at this time. Printed after visit summary with written instruction provided.    1248: Patient dressed with family assistance.     1252: Peripheral IV removed with no complications.     1255: Patient to main lobby via transport with all belongings in stable condition. Phase 2 complete.

## 2025-03-25 NOTE — ANESTHESIA POSTPROCEDURE EVALUATION
Patient: Amaya Todd    Procedure Summary       Date: 03/25/25 Room / Location: [facility] / [facility]    Anesthesia Start: 1008 Anesthesia Stop: 1148    Procedure: Laparoscopic cholecystectomy Diagnosis:       Biliary colic      (Biliary colic [K80.50])    Surgeons: Autumn Jose MD Responsible Provider: CHERIE Mckinney    Anesthesia Type: general ASA Status: 2            Anesthesia Type: general    Vitals Value Taken Time   /70 03/25/25 1237   Temp 36.8 °C (98.2 °F) 03/25/25 1140   Pulse 80 03/25/25 1239   Resp 13 03/25/25 1239   SpO2 98 % 03/25/25 1240   Vitals shown include unfiled device data.    Anesthesia Post Evaluation    Patient location during evaluation: PACU  Patient participation: complete - patient participated  Level of consciousness: awake  Pain score: 5  Pain management: adequate  Airway patency: patent  Cardiovascular status: acceptable  Respiratory status: acceptable  Hydration status: acceptable  Postoperative Nausea and Vomiting: none    There were no known notable events for this encounter.

## 2025-03-25 NOTE — TELEPHONE ENCOUNTER
Spoke with pt regarding having surgery today and wondering if she can breastfeed afterward. Pt stated they gave her Toradol inpatient, and percocet both inpatient as well as a px for outpatient (pt does not feel like she will need to use it though feels comfortable with Tylenol/mortin). Advised pt this should be okay since we usually give these meds to  pt's. Pt reassured.

## 2025-03-25 NOTE — ANESTHESIA PREPROCEDURE EVALUATION
Patient: Amaya Todd    Procedure Information       Date/Time: 03/25/25 1015    Procedure: Laparoscopic cholecystectomy, possible open - 2 HOURS    Location: [facility] / [facility]    Surgeons: Autumn Jose MD            Relevant Problems   Anesthesia (within normal limits)      Neuro   (+) Anxiety   (+) Generalized anxiety disorder      GI   (+) GERD (gastroesophageal reflux disease)      /Renal   (+) Renal calculi      Hematology   (+) Anemia       Clinical information reviewed:   Tobacco  Allergies  Meds   Med Hx  Surg Hx  OB Status  Fam Hx          NPO Detail:  NPO/Void Status  Date of Last Liquid: 03/24/25  Time of Last Liquid: 2000  Date of Last Solid: 03/24/25  Time of Last Solid: 2000         Physical Exam    Airway  Mallampati: II  TM distance: >3 FB  Neck ROM: full     Cardiovascular - normal exam     Dental        Pulmonary - normal exam     Abdominal   (+) obese         Anesthesia Plan    History of general anesthesia?: yes  History of complications of general anesthesia?: no    ASA 2     general     The patient is not a current smoker.    intravenous induction   Postoperative administration of opioids is intended.  Anesthetic plan and risks discussed with patient.    Plan discussed with CRNA.

## 2025-03-25 NOTE — ANESTHESIA PROCEDURE NOTES
Airway  Date/Time: 3/25/2025 10:25 AM  Urgency: elective    Airway not difficult    Staffing  Performed: CRNA   Authorized by: CHERIE Mckinney    Performed by: CHERIE Mckinney  Patient location during procedure: OR    Indications and Patient Condition  Indications for airway management: anesthesia and airway protection  Sedation level: deep  Preoxygenated: yes  Patient position: sniffing  Mask difficulty assessment: 1 - vent by mask    Final Airway Details  Final airway type: endotracheal airway      Successful airway: ETT  Cuffed: yes   Successful intubation technique: video laryngoscopy  Facilitating devices/methods: intubating stylet  Endotracheal tube insertion site: oral  Blade: Khari  Blade size: #3  ETT size (mm): 7.0  Cormack-Lehane Classification: grade I - full view of glottis  Placement verified by: chest auscultation, capnometry and palpation of cuff   Placement verification comments: (VIDEO VIEW OF TUBE GOING THROUGH CLEAR CORDS)  Measured from: lips  ETT to lips (cm): 22  Number of attempts at approach: 1  Number of other approaches attempted: 0

## 2025-04-07 ENCOUNTER — APPOINTMENT (OUTPATIENT)
Dept: SURGERY | Facility: CLINIC | Age: 35
End: 2025-04-07
Payer: COMMERCIAL

## 2025-04-07 VITALS
OXYGEN SATURATION: 98 % | WEIGHT: 208.4 LBS | HEIGHT: 68 IN | BODY MASS INDEX: 31.58 KG/M2 | SYSTOLIC BLOOD PRESSURE: 124 MMHG | HEART RATE: 84 BPM | DIASTOLIC BLOOD PRESSURE: 85 MMHG

## 2025-04-07 DIAGNOSIS — K81.1 CHRONIC CHOLECYSTITIS: ICD-10-CM

## 2025-04-07 DIAGNOSIS — K80.50 BILIARY COLIC: Primary | ICD-10-CM

## 2025-04-07 PROCEDURE — 3008F BODY MASS INDEX DOCD: CPT | Performed by: SURGERY

## 2025-04-07 PROCEDURE — 99024 POSTOP FOLLOW-UP VISIT: CPT | Performed by: SURGERY

## 2025-04-07 PROCEDURE — 1036F TOBACCO NON-USER: CPT | Performed by: SURGERY

## 2025-04-07 ASSESSMENT — ENCOUNTER SYMPTOMS
FEVER: 0
DIARRHEA: 0
COUGH: 0
BLOOD IN STOOL: 0
DIZZINESS: 0
NAUSEA: 0
CHILLS: 0
SHORTNESS OF BREATH: 0
VOMITING: 0
CONSTIPATION: 1
ABDOMINAL PAIN: 1
HEADACHES: 0
PALPITATIONS: 0

## 2025-04-07 NOTE — PROGRESS NOTES
GENERAL SURGERY CLINIC NOTE    Amaya Todd   1990   [id number]     History Of Present Illness  Amaya Todd is a 34 y.o. female who presents to the office after undergoing laparoscopic cholecystectomy with lysis of adhesions 3/25/25. She has some gas pain in her upper abdomen that improved with taking gas X. She is taking Miralax to prevent constipation as well. She is tolerating a diet.    She has 4 children ages 6, 4, 2 and <1. She works from home and looks after the kids at the same time. She is breastfeeding her youngest and plans to until about 2 years of age. She does not have excess breastmilk frozen. Her mother and mother in law can help during the day with childcare.     Past Medical History  She has a past medical history of Anxiety, Dental disease, Depression, GERD (gastroesophageal reflux disease), and Kidney stones.    Surgical History  She has a past surgical history that includes  section, low transverse (10/28/2022); Galien tooth extraction (2020); Dilation and curettage of uterus (10/31/2019); and Cholecystectomy (2025).  C section x4    Medications  Current Outpatient Medications on File Prior to Visit   Medication Sig Dispense Refill    acetaminophen (Tylenol) 325 mg tablet Take 2 tablets (650 mg) by mouth every 6 hours. (Patient taking differently: Take 2 tablets (650 mg) by mouth every 6 hours if needed.) 90 tablet 0    ibuprofen 600 mg tablet Take 1 tablet (600 mg) by mouth every 6 hours. 30 tablet 0    norethindrone (Micronor) 0.35 mg tablet Take 1 tablet (0.35 mg) over 28 days by mouth once daily. 28 tablet 11    oxyCODONE-acetaminophen (Percocet) 5-325 mg tablet Take 1 tablet by mouth every 6 hours if needed for severe pain (7 - 10). 15 tablet 0    polyethylene glycol (Miralax) 17 gram/dose powder Mix 17 g (one capful) of powder into 6-8 ounces of liquid and drink once daily. 238 g 0     No current facility-administered medications on file prior to visit.  "      Allergies  Patient has no known allergies.     Social History  She reports that she quit smoking about 8 years ago. Her smoking use included cigarettes. She started smoking about 10 years ago. She has a 0.7 pack-year smoking history. She has never used smokeless tobacco. She reports that she does not currently use alcohol. She reports that she does not use drugs.    Family History  Family History   Problem Relation Name Age of Onset    Alcohol abuse Mother Lien     Hypertension Mother Lien     Depression Mother Lien     Alcohol abuse Father Travis elaine     Cancer Maternal Grandfather Alan     Colon cancer Maternal Grandfather Alan     Breast cancer Maternal Grandmother Marilou     Cancer Maternal Grandmother Marilou     Diabetes Paternal Grandmother Mariella     Cancer Mother's Brother Lucho         Review of Systems   Constitutional:  Negative for chills and fever.   Respiratory:  Negative for cough and shortness of breath.    Cardiovascular:  Negative for chest pain and palpitations.   Gastrointestinal:  Positive for abdominal pain and constipation. Negative for blood in stool, diarrhea, nausea and vomiting.        Pt has been experiencing constipation/diarrhea since giving birth last year   Neurological:  Negative for dizziness and headaches.   All other systems reviewed and are negative.      Last Recorded Vitals  Blood pressure 124/85, pulse 84, height 1.727 m (5' 8\"), weight 94.5 kg (208 lb 6.4 oz), last menstrual period 03/01/2025, SpO2 98%, currently breastfeeding.     Physical Exam  Constitutional:       General: She is not in acute distress.     Appearance: Normal appearance. She is not ill-appearing.   HENT:      Head: Normocephalic and atraumatic.   Cardiovascular:      Rate and Rhythm: Normal rate and regular rhythm.   Pulmonary:      Effort: Pulmonary effort is normal. No respiratory distress.      Breath sounds: Normal breath sounds.   Abdominal:      General: There is no " distension.      Palpations: Abdomen is soft.      Tenderness: There is no abdominal tenderness. There is no guarding.      Comments: Incisions healing well   Musculoskeletal:         General: No swelling.   Skin:     General: Skin is warm and dry.   Neurological:      Mental Status: She is alert and oriented to person, place, and time. Mental status is at baseline.   Psychiatric:         Mood and Affect: Mood normal.         Behavior: Behavior normal.          Relevant Results    Surgical pathology 3/25/25  FINAL DIAGNOSIS   A. Gallbladder, cholecystectomy:  - Chronic cholecystitis with cholelithiasis.     US right upper quadrant  Two non specific hepatic lesions largest measuring 2.4 x 1.6 x 1.8 cm.  Assuming no prior history of malignancy and no prior history of liver disease, this likely reflects a benign hemangioma. Cholelithiasis.  No gallbladder wall thickening or biliary dilatation is appreciated Signed by Cr Julien MD      Assessment and Plan  34 y.o. female with cholelithiasis and symptoms consistent with biliary colic now status post laparoscopic cholecystectomy and lysis of adhesions 3/25/25. She is recovering well postoperatively. I discussed the pathology results with her. I recommend continuing to take gas X or a similar medication until the gas discomfort resolves. Follow up on an as needed basis. She expressed her understanding and all questions were answered.     Autumn Jose MD, FACS  General Surgery

## 2025-04-14 ENCOUNTER — APPOINTMENT (OUTPATIENT)
Dept: PRIMARY CARE | Facility: CLINIC | Age: 35
End: 2025-04-14
Payer: COMMERCIAL

## 2025-04-14 VITALS
HEIGHT: 68 IN | WEIGHT: 210.8 LBS | HEART RATE: 83 BPM | DIASTOLIC BLOOD PRESSURE: 80 MMHG | BODY MASS INDEX: 31.95 KG/M2 | SYSTOLIC BLOOD PRESSURE: 130 MMHG | OXYGEN SATURATION: 99 % | TEMPERATURE: 98.3 F

## 2025-04-14 DIAGNOSIS — Z00.00 ROUTINE GENERAL MEDICAL EXAMINATION AT A HEALTH CARE FACILITY: Primary | ICD-10-CM

## 2025-04-14 DIAGNOSIS — N39.46 URGE AND STRESS INCONTINENCE: ICD-10-CM

## 2025-04-14 PROBLEM — O47.03 PRETERM UTERINE CONTRACTIONS, ANTEPARTUM, THIRD TRIMESTER (HHS-HCC): Status: RESOLVED | Noted: 2024-03-01 | Resolved: 2025-04-14

## 2025-04-14 PROCEDURE — 1036F TOBACCO NON-USER: CPT | Performed by: FAMILY MEDICINE

## 2025-04-14 PROCEDURE — 3008F BODY MASS INDEX DOCD: CPT | Performed by: FAMILY MEDICINE

## 2025-04-14 PROCEDURE — 99203 OFFICE O/P NEW LOW 30 MIN: CPT | Performed by: FAMILY MEDICINE

## 2025-04-14 RX ORDER — BISMUTH SUBSALICYLATE 262 MG
1 TABLET,CHEWABLE ORAL DAILY
COMMUNITY

## 2025-04-14 ASSESSMENT — ENCOUNTER SYMPTOMS
LOSS OF SENSATION IN FEET: 0
DEPRESSION: 0
OCCASIONAL FEELINGS OF UNSTEADINESS: 0

## 2025-04-14 ASSESSMENT — PATIENT HEALTH QUESTIONNAIRE - PHQ9
1. LITTLE INTEREST OR PLEASURE IN DOING THINGS: NOT AT ALL
SUM OF ALL RESPONSES TO PHQ9 QUESTIONS 1 AND 2: 0
2. FEELING DOWN, DEPRESSED OR HOPELESS: NOT AT ALL

## 2025-04-14 NOTE — PROGRESS NOTES
"Assessment/Plan     - utd on routine screenings. Discussed anticipatory guidance.   Patient is up to date on all vaccinations.      Problem List Items Addressed This Visit    None  Visit Diagnoses       Routine general medical examination at a health care facility    -  Primary    Urge and stress incontinence        Relevant Orders    Referral to Physical Therapy                  Subjective   Patient ID: Amaya Todd is a 34 y.o. female who presents for Establish Care.    Cholecystecomy 3 weeks ago   Has 4 kids 7, 5 , 3, 1     Pap: 9/25/2023 HPV Negative normal    w/ vasectomy     Had chicken pox as a kid.      Urinary urgency   Seems cyclic   She feels like she has to pee bu tnothing comes out       Objective   /80 (BP Location: Left arm, Patient Position: Sitting, BP Cuff Size: Adult)   Pulse 83   Temp 36.8 °C (98.3 °F) (Skin)   Ht 1.721 m (5' 7.75\")   Wt 95.6 kg (210 lb 12.8 oz)   LMP 03/01/2025   SpO2 99%   BMI 32.29 kg/m²     Physical Exam:     Constitutional:   General: not in acute distress  Appearance: normal appearance, non-toxic, not ill-appearing or diaphoretic.   HENT:   Head: Normocephalic and atraumatic  Eyes: EOMI, PERRL  CV: RRR, Normal Pulses, Normal Heart sounds  Pulm: CTAB, effort normal          Natacha Reyna DO     I spent a total of 30 minutes on the date of the service which included preparing to see the patient, face-to-face patient care, completing clinical documentation, performing a medically appropriate examination, counseling and educating the patient/family/caregiver and ordering medications, tests, or procedures.   "

## 2025-04-14 NOTE — PATIENT INSTRUCTIONS
Here is the PT office in the [facility] (our same location)  [facility]  [address] [place], [place] [id number] [phone]    If this location doesn't work look through the Physical Therapy site on the  website to find a location suitable for you:  [email or web address]

## 2025-05-30 ENCOUNTER — OFFICE VISIT (OUTPATIENT)
Dept: PRIMARY CARE | Facility: CLINIC | Age: 35
End: 2025-05-30

## 2025-05-30 VITALS
WEIGHT: 208.6 LBS | DIASTOLIC BLOOD PRESSURE: 80 MMHG | SYSTOLIC BLOOD PRESSURE: 140 MMHG | OXYGEN SATURATION: 97 % | TEMPERATURE: 97.7 F | HEART RATE: 98 BPM | BODY MASS INDEX: 31.95 KG/M2

## 2025-05-30 DIAGNOSIS — L73.9 FOLLICULITIS: Primary | ICD-10-CM

## 2025-05-30 PROCEDURE — 99213 OFFICE O/P EST LOW 20 MIN: CPT | Performed by: FAMILY MEDICINE

## 2025-05-30 RX ORDER — MUPIROCIN 20 MG/G
OINTMENT TOPICAL 3 TIMES DAILY
Qty: 22 G | Refills: 0 | Status: SHIPPED | OUTPATIENT
Start: 2025-05-30 | End: 2025-06-09

## 2025-05-30 NOTE — PROGRESS NOTES
Assessment/Plan     ***    Problem List Items Addressed This Visit    None            Subjective   Patient ID: Amaya Todd is a 34 y.o. female who presents for Lumps in neck.    Rash on arms and back   Started Sunday   Itchy weird bump     Objective   /80 (BP Location: Left arm, Patient Position: Sitting, BP Cuff Size: Large adult)   Pulse 98   Temp 36.5 °C (97.7 °F) (Skin)   Wt 94.6 kg (208 lb 9.6 oz)   SpO2 97%   BMI 31.95 kg/m²     Physical Exam:     Constitutional:   General: not in acute distress  Appearance: normal appearance, non-toxic, not ill-appearing or diaphoretic.   HENT:   Head: Normocephalic and atraumatic  Eyes: EOMI, PERRL  CV: RRR, Normal Pulses, Normal Heart sounds  Pulm: CTAB, effort normal  Neuro: CN II-XII Intact, alert, oriented x3          Natacha Reyna, DO     I spent a total of *** minutes on the date of the service which included preparing to see the patient, face-to-face patient care, completing clinical documentation, performing a medically appropriate examination, counseling and educating the patient/family/caregiver and ordering medications, tests, or procedures.

## 2025-06-03 ENCOUNTER — APPOINTMENT (OUTPATIENT)
Dept: PRIMARY CARE | Facility: CLINIC | Age: 35
End: 2025-06-03

## 2025-07-06 ENCOUNTER — PATIENT MESSAGE (OUTPATIENT)
Dept: PRIMARY CARE | Facility: CLINIC | Age: 35
End: 2025-07-06

## 2025-07-06 DIAGNOSIS — R59.0 CERVICAL LYMPHADENOPATHY: Primary | ICD-10-CM

## 2025-07-10 ENCOUNTER — OFFICE VISIT (OUTPATIENT)
Dept: URGENT CARE | Age: 35
End: 2025-07-10

## 2025-07-10 VITALS
HEART RATE: 102 BPM | OXYGEN SATURATION: 98 % | RESPIRATION RATE: 22 BRPM | TEMPERATURE: 98 F | BODY MASS INDEX: 31.86 KG/M2 | WEIGHT: 208 LBS | DIASTOLIC BLOOD PRESSURE: 95 MMHG | SYSTOLIC BLOOD PRESSURE: 151 MMHG

## 2025-07-10 DIAGNOSIS — L73.9 FOLLICULITIS: Primary | ICD-10-CM

## 2025-07-10 PROCEDURE — 1036F TOBACCO NON-USER: CPT

## 2025-07-10 PROCEDURE — 99203 OFFICE O/P NEW LOW 30 MIN: CPT

## 2025-07-10 ASSESSMENT — ENCOUNTER SYMPTOMS
OCCASIONAL FEELINGS OF UNSTEADINESS: 0
DEPRESSION: 0
LOSS OF SENSATION IN FEET: 0

## 2025-07-10 ASSESSMENT — PATIENT HEALTH QUESTIONNAIRE - PHQ9
1. LITTLE INTEREST OR PLEASURE IN DOING THINGS: NOT AT ALL
SUM OF ALL RESPONSES TO PHQ9 QUESTIONS 1 AND 2: 1
2. FEELING DOWN, DEPRESSED OR HOPELESS: SEVERAL DAYS

## 2025-07-10 NOTE — PROGRESS NOTES
Subjective   Patient ID: Amaya Todd is a 34 y.o. female. They present today with a chief complaint of Skin Tag (Right side hair bump/finished topical antibiotics and concerned).    History of Present Illness  HPI a 34-year-old female arrives to clinic with chief complaint of bump to the right side of her hair/head.  The patient reports that she was seen and treated for folliculitis by her primary care provider was given a prescription antibiotic cream.  She reports that the redness and irritation has gone down however she can feel a bump described as a lymph node.  She contacted her primary care provider today in which they ordered an ultrasound however she would like to be seen here for evaluation.  She reports no other symptoms.    Past Medical History  Allergies as of 07/10/2025    (No Known Allergies)       Prescriptions Prior to Admission[1]     Medical History[2]    Surgical History[3]     reports that she quit smoking about 8 years ago. Her smoking use included cigarettes. She started smoking about 11 years ago. She has a 0.7 pack-year smoking history. She has been exposed to tobacco smoke. She has never used smokeless tobacco. She reports that she does not currently use alcohol. She reports that she does not use drugs.    Review of Systems  Review of Systems  Redness    Objective    Vitals:    07/10/25 1909   BP: (!) 151/95   Pulse: 102   Resp: 22   Temp: 36.7 °C (98 °F)   SpO2: 98%   Weight: 94.3 kg (208 lb)     Patient's last menstrual period was 07/10/2025.    Physical Exam  HENT:      Head:         Folliculitis  Procedures    Point of Care Test & Imaging Results from this visit  No results found for this visit on 07/10/25.   Imaging  No results found.    Cardiology, Vascular, and Other Imaging  No other imaging results found for the past 2 days      Diagnostic study results (if any) were reviewed by CLARI Chandler-BAKARI.    Assessment/Plan   Allergies, medications, history, and pertinent  labs/EKGs/Imaging reviewed by LAURA Chandler.     Medical Decision Making  Minor folliculitis noted to the right posterior aspect of her head.  There is a small palpable posterior cervical lymph node.  There is no pain or tenderness upon palpation.  I instructed the patient to begin Zyrtec and Flonase.  She is able to do the ultrasound to rule out any other abnormalities however given the recent folliculitis, reasonable for the lymph node to swell.  Follow-up with primary care provider.  Patient agrees to plan of care was discharged stable condition.    As a result of the work-up, the patient was discharged home.  she was informed of her diagnosis and instructed to come back with any concerns or worsening of condition.  she and was agreeable to the plan as discussed above.  she was given the opportunity to ask questions.  All of the patient's questions were answered.    This document was generated using the assistance of voice recognition software. If there are any errors of spelling, grammar, syntax, or meaning; please feel free to contact me directly for clarification.     Orders and Diagnoses  Diagnoses and all orders for this visit:  Folliculitis      Medical Admin Record      Patient disposition: Home    Electronically signed by LAURA Chandler  7:57 PM           [1] (Not in a hospital admission)   [2]   Past Medical History:  Diagnosis Date    Anxiety     Dental disease     crowns    Depression     GERD (gastroesophageal reflux disease)     Kidney stones    [3]   Past Surgical History:  Procedure Laterality Date     SECTION, LOW TRANSVERSE  10/28/2022     section x 4    CHOLECYSTECTOMY  2025    DILATION AND CURETTAGE OF UTERUS  10/31/2019    Dilation and curettage    WISDOM TOOTH EXTRACTION  2020    [place] tooth extraction

## 2025-08-13 ENCOUNTER — TELEMEDICINE CLINICAL SUPPORT (OUTPATIENT)
Dept: PRIMARY CARE | Facility: CLINIC | Age: 35
End: 2025-08-13

## 2025-08-14 ENCOUNTER — TELEMEDICINE (OUTPATIENT)
Facility: CLINIC | Age: 35
End: 2025-08-14

## 2025-08-14 DIAGNOSIS — R14.0 FLATULENCE/GAS PAIN/BELCHING: Primary | ICD-10-CM

## 2025-08-14 PROCEDURE — 99213 OFFICE O/P EST LOW 20 MIN: CPT | Performed by: NURSE PRACTITIONER

## 2025-08-14 ASSESSMENT — ENCOUNTER SYMPTOMS
DIARRHEA: 0
VOMITING: 0
NAUSEA: 0
CONSTIPATION: 0
BLOOD IN STOOL: 0
RECTAL PAIN: 0
ABDOMINAL DISTENTION: 0
ABDOMINAL PAIN: 1

## 2025-08-16 ENCOUNTER — APPOINTMENT (OUTPATIENT)
Dept: RADIOLOGY | Facility: HOSPITAL | Age: 35
End: 2025-08-16

## 2025-08-21 ENCOUNTER — OFFICE VISIT (OUTPATIENT)
Dept: URGENT CARE | Age: 35
End: 2025-08-21

## 2025-08-21 VITALS
TEMPERATURE: 97.7 F | DIASTOLIC BLOOD PRESSURE: 84 MMHG | SYSTOLIC BLOOD PRESSURE: 143 MMHG | RESPIRATION RATE: 17 BRPM | OXYGEN SATURATION: 98 % | HEART RATE: 90 BPM

## 2025-08-21 DIAGNOSIS — H93.8X3 PRESSURE SENSATION IN BOTH EARS: ICD-10-CM

## 2025-08-21 DIAGNOSIS — L73.9 FOLLICULITIS: Primary | ICD-10-CM

## 2025-08-21 PROCEDURE — 99213 OFFICE O/P EST LOW 20 MIN: CPT

## 2025-08-21 RX ORDER — DOXYCYCLINE 100 MG/1
100 CAPSULE ORAL 2 TIMES DAILY
Qty: 20 CAPSULE | Refills: 0 | Status: SHIPPED | OUTPATIENT
Start: 2025-08-21 | End: 2025-08-31

## 2025-08-21 RX ORDER — PREDNISONE 20 MG/1
40 TABLET ORAL DAILY
Qty: 10 TABLET | Refills: 0 | Status: SHIPPED | OUTPATIENT
Start: 2025-08-21 | End: 2025-08-26

## 2025-11-10 ENCOUNTER — APPOINTMENT (OUTPATIENT)
Facility: CLINIC | Age: 35
End: 2025-11-10

## 2026-04-13 ENCOUNTER — APPOINTMENT (OUTPATIENT)
Dept: PRIMARY CARE | Facility: CLINIC | Age: 36
End: 2026-04-13
Payer: COMMERCIAL

## (undated) DEVICE — TOWEL PACK, STERILE, 4/PACK, BLUE

## (undated) DEVICE — ACCESS SYS, KII SHIELDED BLADED, Z-THREAD, 12X100CM

## (undated) DEVICE — TISSUE ADHESIVE, PREMIERPRO EXOFIN, PRECISION PEN HV, 1.0ML

## (undated) DEVICE — Device

## (undated) DEVICE — DRAPE PACK, CESAREAN SECTION, CUSTOM, UHC

## (undated) DEVICE — SUTURE, MONOCRYL, 0, 36 IN, CTX, VIOLET

## (undated) DEVICE — NEEDLE, INSUFFLATION, 13GAX120MM, DISP

## (undated) DEVICE — SUTURE, MONOCRYL, 0, 18 IN, VIOLET

## (undated) DEVICE — SUTURE, VICRYL, 4-0, 18 IN, UNDYED BR PS-2

## (undated) DEVICE — GLOVE, PROTEXIS PI CLASSIC, SZ-6.5, PF, LF

## (undated) DEVICE — SYSTEM, SMOKE EVAC, SEECLEAR XCL, 8.0 LITER, LF

## (undated) DEVICE — SCOPE WARMER, LAPAROSCOPE, BAG ONLY, LF

## (undated) DEVICE — SUTURE, PDS II, 0, 60 IN, CTX, VIOLET

## (undated) DEVICE — SOLUTION, IRRIGATION, SODIUM CHLORIDE 0.9%, 1000 ML, POUR BOTTLE

## (undated) DEVICE — GLOVE, SURGICAL, PROTEXIS PI BLUE W/NEUTHERA, 6.5, PF, LF

## (undated) DEVICE — SYRINGE, 10 CC, LUER LOCK

## (undated) DEVICE — RETRIEVAL SYSTEM, MONARCH, 10MM DISP ENDOSCOPIC

## (undated) DEVICE — TROCAR CLOSURE SUTURE GRASPER

## (undated) DEVICE — SUTURE, VICRYL, 4-0, 27 IN, KS, UNDYED

## (undated) DEVICE — TROCAR, KII OPTICAL BLADELESS 5MM Z THREAD 100MM LNGTH

## (undated) DEVICE — CABLE, ELECTROSURGICAL, MONOPOLAR, LAPAROSCOPIC, 10 FT, LF

## (undated) DEVICE — SUTURE, VICRYL, 0, 27 IN, UR-6, VIOLET

## (undated) DEVICE — CAUTERY, PENCIL, PUSH BUTTON, SMOKE EVAC, 70MM

## (undated) DEVICE — HEMOSTAT, ARISTA, ABSORBABLE, 3 GRAMS

## (undated) DEVICE — PAD, GROUNDING, ELECTROSURGICAL, W/9 FT CABLE, POLYHESIVE II, ADULT, LF

## (undated) DEVICE — ASSEMBLY, STRYKER FLOW 2, SUCTION IRRIGATOR, WITH TIP

## (undated) DEVICE — SUTURE, MONOCRYL, 2-0, 36 IN, CTX, VIOLET

## (undated) DEVICE — SCISSOR TIP, ENDOCUT, LAPAROSCOPIC

## (undated) DEVICE — SYSTEM, TISSUE RETENTION, W/PADS & STRAPS

## (undated) DEVICE — SLEEVE, KII, Z-THREAD, 5X100CM

## (undated) DEVICE — APPLICATOR, CHLORAPREP, W/ORANGE TINT, 26ML

## (undated) DEVICE — NEEDLE, HYPODERMIC, REGULAR WALL, REGULAR BEVEL, 22 G X 1.5 IN

## (undated) DEVICE — CLIP, LIGATING, HEM-O-LOCK, MEDIUM/LARGE, LF, GREEN